# Patient Record
Sex: FEMALE
[De-identification: names, ages, dates, MRNs, and addresses within clinical notes are randomized per-mention and may not be internally consistent; named-entity substitution may affect disease eponyms.]

---

## 2018-06-07 ENCOUNTER — HOSPITAL ENCOUNTER (EMERGENCY)
Dept: HOSPITAL 14 - H.ER | Age: 33
Discharge: HOME | End: 2018-06-07
Payer: COMMERCIAL

## 2018-06-07 VITALS
DIASTOLIC BLOOD PRESSURE: 72 MMHG | RESPIRATION RATE: 16 BRPM | HEART RATE: 84 BPM | OXYGEN SATURATION: 98 % | SYSTOLIC BLOOD PRESSURE: 117 MMHG | TEMPERATURE: 98.3 F

## 2018-06-07 DIAGNOSIS — Z88.0: ICD-10-CM

## 2018-06-07 DIAGNOSIS — M79.604: Primary | ICD-10-CM

## 2018-06-07 NOTE — ED PDOC
Lower Extremity Pain/Injury


Time Seen by Provider: 06/07/18 20:22


Chief Complaint (Nursing): Lower Extremity Problem/Injury


Chief Complaint (Provider): Right calf pain


History Per: Patient


History/Exam Limitations: no limitations


Onset/Duration Of Symptoms: Days (x1 week)


Current Symptoms Are (Timing): Still Present


Additional Complaint(s): 





Citlalli Christian is a 33 year old female, with no significant past medical 

history, who presents to the emergency department complaining of a dull right 

calf pain onset for x1 week. Patient reports she recently travelled to [place]. 

She denies being on birth control. She denies any recent injuries, chest pain, 

shortness of breath, fever, or chills. No further medical complaints.





PMD: None provided





Past Medical History


Reviewed: Historical Data, Nursing Documentation, Vital Signs


Vital Signs: 





 Last Vital Signs











Temp  98.3 F   06/07/18 20:12


 


Pulse  84   06/07/18 20:12


 


Resp  16   06/07/18 20:12


 


BP  117/72   06/07/18 20:12


 


Pulse Ox  98   06/07/18 20:12














- Medical History


PMH: No Chronic Diseases





- Surgical History


Surgical History: No Surg Hx





- Family History


Family History: States: No Known Family Hx





- Allergies


Allergies/Adverse Reactions: 


 Allergies











Allergy/AdvReac Type Severity Reaction Status Date / Time


 


Penicillins Allergy  RASH Verified 06/07/18 20:16














Review of Systems


ROS Statement: Except As Marked, All Systems Reviewed And Found Negative


Constitutional: Negative for: Fever, Chills


Cardiovascular: Negative for: Chest Pain


Respiratory: Negative for: Shortness of Breath


Musculoskeletal: Positive for: Leg Pain (right calf)





Physical Exam





- Reviewed


Nursing Documentation Reviewed: Yes


Vital Signs Reviewed: Yes





- Physical Exam


Appears: Positive for: Non-toxic


Head Exam: Positive for: ATRAUMATIC, NORMOCEPHALIC


Skin: Positive for: Normal Color, Warm, Dry


Eye Exam: Positive for: Normal appearance


Neck: Positive for: Painless ROM


Respiratory: Negative for: Respiratory Distress


Extremity: Positive for: Normal ROM (upper and lower extremities), Other (

stretching sensation with dorsiflexion).  Negative for: Tenderness, Calf 

Tenderness, Deformity, Swelling


Neurologic/Psych: Positive for: Alert, Oriented





- ECG


O2 Sat by Pulse Oximetry: 98 (RA)


Pulse Ox Interpretation: Normal





Medical Decision Making


Medical Decision Making: 





Time: 20:22


Initial Impression: 





Initial Plan:





--Duplex Lower Extrm Vein Right [US]





--------------------------------------------------------------------------------

-----------------


Scribe Attestation:


Documented by Noe Nieto, acting as a scribe for Michelle Amaya PA-C





Provider Scribe Attestation:


All medical record entries made by the Scribe were at my direction and 

personally dictated by me. I have reviewed the chart and agree that the record 

accurately reflects my personal performance of the history, physical exam, 

medical decision making, and the department course for this patient. I have 

also personally directed, reviewed, and agree with the discharge instructions 

and disposition.





Disposition





- Clinical Impression


Clinical Impression: 


 Calf pain








- Patient ED Disposition


Is Patient to be Admitted: No


Counseled Patient/Family Regarding: Diagnosis, Need For Followup





- Disposition


Disposition: Routine/Home


Disposition Time: 21:20


Condition: GOOD


Instructions:  Muscle Strain


Forms:  [employer] (English)

## 2018-06-07 NOTE — US
EXAM:

  US Duplex Right Lower Extremity Veins



CLINICAL HISTORY:

  33 years old, female; Pain; Leg, lower; Right; Additional info: Right calf 

pain



TECHNIQUE:

  Real-time duplex ultrasound scan of the right lower extremity veins 

integrating B-mode two-dimensional vascular structure, Doppler spectral 

analysis, color flow Doppler imaging and compression.



COMPARISON:

  No relevant prior studies available.



FINDINGS:

  Deep veins:  No DVT in the visualized common femoral, femoral, proximal deep 

femoral, popliteal or posterior tibial veins.  The veins demonstrate normal 

color flow, are normally compressible, with normal phasic flow and/or 

augmentation response.



IMPRESSION:     

  No evidence of deep venous thrombosis in the visualized veins of the right 

lower extremity.

## 2020-11-20 ENCOUNTER — RESULT REVIEW (OUTPATIENT)
Age: 35
End: 2020-11-20

## 2021-11-09 ENCOUNTER — APPOINTMENT (OUTPATIENT)
Dept: ANTEPARTUM | Facility: CLINIC | Age: 36
End: 2021-11-09
Payer: COMMERCIAL

## 2021-11-09 ENCOUNTER — ASOB RESULT (OUTPATIENT)
Age: 36
End: 2021-11-09

## 2021-11-09 PROBLEM — Z00.00 ENCOUNTER FOR PREVENTIVE HEALTH EXAMINATION: Status: ACTIVE | Noted: 2021-11-09

## 2021-11-09 PROCEDURE — 76813 OB US NUCHAL MEAS 1 GEST: CPT

## 2021-11-09 PROCEDURE — 76801 OB US < 14 WKS SINGLE FETUS: CPT

## 2021-12-07 ENCOUNTER — APPOINTMENT (OUTPATIENT)
Dept: ANTEPARTUM | Facility: CLINIC | Age: 36
End: 2021-12-07
Payer: COMMERCIAL

## 2021-12-07 ENCOUNTER — TRANSCRIPTION ENCOUNTER (OUTPATIENT)
Age: 36
End: 2021-12-07

## 2021-12-07 ENCOUNTER — ASOB RESULT (OUTPATIENT)
Age: 36
End: 2021-12-07

## 2021-12-07 PROCEDURE — 76811 OB US DETAILED SNGL FETUS: CPT

## 2021-12-07 PROCEDURE — 76817 TRANSVAGINAL US OBSTETRIC: CPT

## 2022-01-11 ENCOUNTER — APPOINTMENT (OUTPATIENT)
Dept: ANTEPARTUM | Facility: CLINIC | Age: 37
End: 2022-01-11
Payer: COMMERCIAL

## 2022-01-11 ENCOUNTER — ASOB RESULT (OUTPATIENT)
Age: 37
End: 2022-01-11

## 2022-01-11 PROCEDURE — 76816 OB US FOLLOW-UP PER FETUS: CPT

## 2022-01-25 ENCOUNTER — ASOB RESULT (OUTPATIENT)
Age: 37
End: 2022-01-25

## 2022-01-25 ENCOUNTER — APPOINTMENT (OUTPATIENT)
Dept: ANTEPARTUM | Facility: CLINIC | Age: 37
End: 2022-01-25
Payer: COMMERCIAL

## 2022-01-25 PROCEDURE — 76819 FETAL BIOPHYS PROFIL W/O NST: CPT

## 2022-03-15 ENCOUNTER — APPOINTMENT (OUTPATIENT)
Dept: ANTEPARTUM | Facility: CLINIC | Age: 37
End: 2022-03-15
Payer: COMMERCIAL

## 2022-03-15 ENCOUNTER — ASOB RESULT (OUTPATIENT)
Age: 37
End: 2022-03-15

## 2022-03-15 PROCEDURE — 76819 FETAL BIOPHYS PROFIL W/O NST: CPT

## 2022-04-05 ENCOUNTER — APPOINTMENT (OUTPATIENT)
Dept: ANTEPARTUM | Facility: CLINIC | Age: 37
End: 2022-04-05
Payer: COMMERCIAL

## 2022-04-05 ENCOUNTER — ASOB RESULT (OUTPATIENT)
Age: 37
End: 2022-04-05

## 2022-04-05 PROCEDURE — 76818 FETAL BIOPHYS PROFILE W/NST: CPT

## 2022-04-19 ENCOUNTER — ASOB RESULT (OUTPATIENT)
Age: 37
End: 2022-04-19

## 2022-04-19 ENCOUNTER — APPOINTMENT (OUTPATIENT)
Dept: ANTEPARTUM | Facility: CLINIC | Age: 37
End: 2022-04-19
Payer: COMMERCIAL

## 2022-04-19 PROCEDURE — 76819 FETAL BIOPHYS PROFIL W/O NST: CPT

## 2022-04-28 ENCOUNTER — ASOB RESULT (OUTPATIENT)
Age: 37
End: 2022-04-28

## 2022-04-28 ENCOUNTER — APPOINTMENT (OUTPATIENT)
Dept: ANTEPARTUM | Facility: CLINIC | Age: 37
End: 2022-04-28
Payer: COMMERCIAL

## 2022-04-28 PROCEDURE — 76816 OB US FOLLOW-UP PER FETUS: CPT

## 2022-04-28 PROCEDURE — 76818 FETAL BIOPHYS PROFILE W/NST: CPT

## 2022-05-05 ENCOUNTER — APPOINTMENT (OUTPATIENT)
Dept: ANTEPARTUM | Facility: CLINIC | Age: 37
End: 2022-05-05
Payer: COMMERCIAL

## 2022-05-05 ENCOUNTER — ASOB RESULT (OUTPATIENT)
Age: 37
End: 2022-05-05

## 2022-05-05 PROCEDURE — 76819 FETAL BIOPHYS PROFIL W/O NST: CPT

## 2022-05-05 PROCEDURE — 76816 OB US FOLLOW-UP PER FETUS: CPT

## 2022-05-12 ENCOUNTER — ASOB RESULT (OUTPATIENT)
Age: 37
End: 2022-05-12

## 2022-05-12 ENCOUNTER — APPOINTMENT (OUTPATIENT)
Dept: ANTEPARTUM | Facility: CLINIC | Age: 37
End: 2022-05-12
Payer: COMMERCIAL

## 2022-05-12 PROCEDURE — 76816 OB US FOLLOW-UP PER FETUS: CPT

## 2022-05-12 PROCEDURE — 76818 FETAL BIOPHYS PROFILE W/NST: CPT

## 2022-05-19 ENCOUNTER — APPOINTMENT (OUTPATIENT)
Dept: ANTEPARTUM | Facility: CLINIC | Age: 37
End: 2022-05-19
Payer: COMMERCIAL

## 2022-05-19 ENCOUNTER — ASOB RESULT (OUTPATIENT)
Age: 37
End: 2022-05-19

## 2022-05-19 PROCEDURE — 76816 OB US FOLLOW-UP PER FETUS: CPT

## 2022-05-19 PROCEDURE — 76819 FETAL BIOPHYS PROFIL W/O NST: CPT

## 2022-05-21 ENCOUNTER — TRANSCRIPTION ENCOUNTER (OUTPATIENT)
Age: 37
End: 2022-05-21

## 2022-05-22 ENCOUNTER — RESULT REVIEW (OUTPATIENT)
Age: 37
End: 2022-05-22

## 2022-05-22 ENCOUNTER — INPATIENT (INPATIENT)
Facility: HOSPITAL | Age: 37
LOS: 2 days | Discharge: HOME | End: 2022-05-25
Attending: OBSTETRICS & GYNECOLOGY | Admitting: OBSTETRICS & GYNECOLOGY
Payer: COMMERCIAL

## 2022-05-22 VITALS — WEIGHT: 169.98 LBS | HEIGHT: 62 IN

## 2022-05-22 DIAGNOSIS — Z34.83 ENCOUNTER FOR SUPERVISION OF OTHER NORMAL PREGNANCY, THIRD TRIMESTER: ICD-10-CM

## 2022-05-22 DIAGNOSIS — Z86.19 PERSONAL HISTORY OF OTHER INFECTIOUS AND PARASITIC DISEASES: ICD-10-CM

## 2022-05-22 DIAGNOSIS — O48.0 POST-TERM PREGNANCY: ICD-10-CM

## 2022-05-22 DIAGNOSIS — O32.4XX0 MATERNAL CARE FOR HIGH HEAD AT TERM, NOT APPLICABLE OR UNSPECIFIED: ICD-10-CM

## 2022-05-22 DIAGNOSIS — Z3A.40 40 WEEKS GESTATION OF PREGNANCY: ICD-10-CM

## 2022-05-22 DIAGNOSIS — Z88.0 ALLERGY STATUS TO PENICILLIN: ICD-10-CM

## 2022-05-22 LAB
BASOPHILS # BLD AUTO: 0.01 K/UL — SIGNIFICANT CHANGE UP (ref 0–0.2)
BASOPHILS NFR BLD AUTO: 0.1 % — SIGNIFICANT CHANGE UP (ref 0–2)
BLD GP AB SCN SERPL QL: NEGATIVE — SIGNIFICANT CHANGE UP
COVID-19 SPIKE DOMAIN AB INTERP: POSITIVE
COVID-19 SPIKE DOMAIN ANTIBODY RESULT: >250 U/ML — HIGH
EOSINOPHIL # BLD AUTO: 0.06 K/UL — SIGNIFICANT CHANGE UP (ref 0–0.5)
EOSINOPHIL NFR BLD AUTO: 0.7 % — SIGNIFICANT CHANGE UP (ref 0–6)
GLUCOSE BLDC GLUCOMTR-MCNC: 87 MG/DL — SIGNIFICANT CHANGE UP (ref 70–99)
GLUCOSE BLDC GLUCOMTR-MCNC: 90 MG/DL — SIGNIFICANT CHANGE UP (ref 70–99)
GLUCOSE BLDC GLUCOMTR-MCNC: 90 MG/DL — SIGNIFICANT CHANGE UP (ref 70–99)
HCT VFR BLD CALC: 34 % — LOW (ref 34.5–45)
HGB BLD-MCNC: 11.2 G/DL — LOW (ref 11.5–15.5)
IMM GRANULOCYTES NFR BLD AUTO: 0.6 % — SIGNIFICANT CHANGE UP (ref 0–1.5)
LYMPHOCYTES # BLD AUTO: 1.14 K/UL — SIGNIFICANT CHANGE UP (ref 1–3.3)
LYMPHOCYTES # BLD AUTO: 13.2 % — SIGNIFICANT CHANGE UP (ref 13–44)
MCHC RBC-ENTMCNC: 29.6 PG — SIGNIFICANT CHANGE UP (ref 27–34)
MCHC RBC-ENTMCNC: 32.9 GM/DL — SIGNIFICANT CHANGE UP (ref 32–36)
MCV RBC AUTO: 89.9 FL — SIGNIFICANT CHANGE UP (ref 80–100)
MONOCYTES # BLD AUTO: 0.62 K/UL — SIGNIFICANT CHANGE UP (ref 0–0.9)
MONOCYTES NFR BLD AUTO: 7.2 % — SIGNIFICANT CHANGE UP (ref 2–14)
NEUTROPHILS # BLD AUTO: 6.75 K/UL — SIGNIFICANT CHANGE UP (ref 1.8–7.4)
NEUTROPHILS NFR BLD AUTO: 78.2 % — HIGH (ref 43–77)
NRBC # BLD: 0 /100 WBCS — SIGNIFICANT CHANGE UP (ref 0–0)
PLATELET # BLD AUTO: 151 K/UL — SIGNIFICANT CHANGE UP (ref 150–400)
RBC # BLD: 3.78 M/UL — LOW (ref 3.8–5.2)
RBC # FLD: 16.1 % — HIGH (ref 10.3–14.5)
RH IG SCN BLD-IMP: POSITIVE — SIGNIFICANT CHANGE UP
RH IG SCN BLD-IMP: POSITIVE — SIGNIFICANT CHANGE UP
SARS-COV-2 IGG+IGM SERPL QL IA: >250 U/ML — HIGH
SARS-COV-2 IGG+IGM SERPL QL IA: POSITIVE
SARS-COV-2 RNA SPEC QL NAA+PROBE: SIGNIFICANT CHANGE UP
WBC # BLD: 8.63 K/UL — SIGNIFICANT CHANGE UP (ref 3.8–10.5)
WBC # FLD AUTO: 8.63 K/UL — SIGNIFICANT CHANGE UP (ref 3.8–10.5)

## 2022-05-22 PROCEDURE — 88307 TISSUE EXAM BY PATHOLOGIST: CPT | Mod: 26

## 2022-05-22 DEVICE — SEPRAFILM 5 X 6": Type: IMPLANTABLE DEVICE | Status: FUNCTIONAL

## 2022-05-22 DEVICE — ARISTA 3GR: Type: IMPLANTABLE DEVICE | Status: FUNCTIONAL

## 2022-05-22 RX ORDER — KETOROLAC TROMETHAMINE 30 MG/ML
30 SYRINGE (ML) INJECTION EVERY 6 HOURS
Refills: 0 | Status: DISCONTINUED | OUTPATIENT
Start: 2022-05-22 | End: 2022-05-23

## 2022-05-22 RX ORDER — CITRIC ACID/SODIUM CITRATE 300-500 MG
15 SOLUTION, ORAL ORAL EVERY 6 HOURS
Refills: 0 | Status: DISCONTINUED | OUTPATIENT
Start: 2022-05-22 | End: 2022-05-22

## 2022-05-22 RX ORDER — OXYTOCIN 10 UNIT/ML
333.33 VIAL (ML) INJECTION
Qty: 20 | Refills: 0 | Status: DISCONTINUED | OUTPATIENT
Start: 2022-05-22 | End: 2022-05-22

## 2022-05-22 RX ORDER — OXYTOCIN 10 UNIT/ML
333.33 VIAL (ML) INJECTION
Qty: 20 | Refills: 0 | Status: DISCONTINUED | OUTPATIENT
Start: 2022-05-22 | End: 2022-05-25

## 2022-05-22 RX ORDER — OXYTOCIN 10 UNIT/ML
VIAL (ML) INJECTION
Qty: 30 | Refills: 0 | Status: DISCONTINUED | OUTPATIENT
Start: 2022-05-22 | End: 2022-05-22

## 2022-05-22 RX ORDER — OXYCODONE HYDROCHLORIDE 5 MG/1
5 TABLET ORAL
Refills: 0 | Status: DISCONTINUED | OUTPATIENT
Start: 2022-05-22 | End: 2022-05-25

## 2022-05-22 RX ORDER — OXYCODONE HYDROCHLORIDE 5 MG/1
5 TABLET ORAL ONCE
Refills: 0 | Status: DISCONTINUED | OUTPATIENT
Start: 2022-05-22 | End: 2022-05-25

## 2022-05-22 RX ORDER — FENTANYL/BUPIVACAINE/NS/PF 2MCG/ML-.1
250 PLASTIC BAG, INJECTION (ML) INJECTION
Refills: 0 | Status: DISCONTINUED | OUTPATIENT
Start: 2022-05-22 | End: 2022-05-22

## 2022-05-22 RX ORDER — IBUPROFEN 200 MG
600 TABLET ORAL EVERY 6 HOURS
Refills: 0 | Status: COMPLETED | OUTPATIENT
Start: 2022-05-22 | End: 2023-04-20

## 2022-05-22 RX ORDER — SODIUM CHLORIDE 9 MG/ML
1000 INJECTION, SOLUTION INTRAVENOUS
Refills: 0 | Status: DISCONTINUED | OUTPATIENT
Start: 2022-05-22 | End: 2022-05-25

## 2022-05-22 RX ORDER — AZITHROMYCIN 500 MG/1
500 TABLET, FILM COATED ORAL ONCE
Refills: 0 | Status: COMPLETED | OUTPATIENT
Start: 2022-05-22 | End: 2022-05-22

## 2022-05-22 RX ORDER — SIMETHICONE 80 MG/1
80 TABLET, CHEWABLE ORAL EVERY 4 HOURS
Refills: 0 | Status: DISCONTINUED | OUTPATIENT
Start: 2022-05-22 | End: 2022-05-25

## 2022-05-22 RX ORDER — CEFAZOLIN SODIUM 1 G
2000 VIAL (EA) INJECTION ONCE
Refills: 0 | Status: COMPLETED | OUTPATIENT
Start: 2022-05-22 | End: 2022-05-22

## 2022-05-22 RX ORDER — CHLORHEXIDINE GLUCONATE 213 G/1000ML
1 SOLUTION TOPICAL DAILY
Refills: 0 | Status: DISCONTINUED | OUTPATIENT
Start: 2022-05-22 | End: 2022-05-22

## 2022-05-22 RX ORDER — LANOLIN
1 OINTMENT (GRAM) TOPICAL EVERY 6 HOURS
Refills: 0 | Status: DISCONTINUED | OUTPATIENT
Start: 2022-05-22 | End: 2022-05-25

## 2022-05-22 RX ORDER — SODIUM CHLORIDE 9 MG/ML
1000 INJECTION, SOLUTION INTRAVENOUS
Refills: 0 | Status: DISCONTINUED | OUTPATIENT
Start: 2022-05-22 | End: 2022-05-22

## 2022-05-22 RX ORDER — ACETAMINOPHEN 500 MG
975 TABLET ORAL
Refills: 0 | Status: DISCONTINUED | OUTPATIENT
Start: 2022-05-22 | End: 2022-05-25

## 2022-05-22 RX ORDER — TETANUS TOXOID, REDUCED DIPHTHERIA TOXOID AND ACELLULAR PERTUSSIS VACCINE, ADSORBED 5; 2.5; 8; 8; 2.5 [IU]/.5ML; [IU]/.5ML; UG/.5ML; UG/.5ML; UG/.5ML
0.5 SUSPENSION INTRAMUSCULAR ONCE
Refills: 0 | Status: DISCONTINUED | OUTPATIENT
Start: 2022-05-22 | End: 2022-05-25

## 2022-05-22 RX ORDER — DIPHENHYDRAMINE HCL 50 MG
25 CAPSULE ORAL EVERY 6 HOURS
Refills: 0 | Status: DISCONTINUED | OUTPATIENT
Start: 2022-05-22 | End: 2022-05-25

## 2022-05-22 RX ORDER — MAGNESIUM HYDROXIDE 400 MG/1
30 TABLET, CHEWABLE ORAL
Refills: 0 | Status: DISCONTINUED | OUTPATIENT
Start: 2022-05-22 | End: 2022-05-25

## 2022-05-22 RX ADMIN — AZITHROMYCIN 255 MILLIGRAM(S): 500 TABLET, FILM COATED ORAL at 20:30

## 2022-05-22 RX ADMIN — Medication 2 MILLIUNIT(S)/MIN: at 08:58

## 2022-05-22 RX ADMIN — Medication 30 MILLIGRAM(S): at 22:17

## 2022-05-22 RX ADMIN — Medication 30 MILLIGRAM(S): at 23:00

## 2022-05-22 RX ADMIN — Medication 250 MILLILITER(S): at 12:34

## 2022-05-22 RX ADMIN — Medication 15 MILLILITER(S): at 20:13

## 2022-05-22 RX ADMIN — Medication 100 MILLIGRAM(S): at 19:55

## 2022-05-22 RX ADMIN — Medication 250 MILLILITER(S): at 12:33

## 2022-05-22 NOTE — PATIENT PROFILE OB - FALL HARM RISK - UNIVERSAL INTERVENTIONS
Bed in lowest position, wheels locked, appropriate side rails in place/Call bell, personal items and telephone in reach/Instruct patient to call for assistance before getting out of bed or chair/Non-slip footwear when patient is out of bed/[place] to call system/Physically safe environment - no spills, clutter or unnecessary equipment/Purposeful Proactive Rounding/Room/bathroom lighting operational, light cord in reach

## 2022-05-23 LAB
BASOPHILS # BLD AUTO: 0.01 K/UL — SIGNIFICANT CHANGE UP (ref 0–0.2)
BASOPHILS NFR BLD AUTO: 0.1 % — SIGNIFICANT CHANGE UP (ref 0–2)
EOSINOPHIL # BLD AUTO: 0.01 K/UL — SIGNIFICANT CHANGE UP (ref 0–0.5)
EOSINOPHIL NFR BLD AUTO: 0.1 % — SIGNIFICANT CHANGE UP (ref 0–6)
HCT VFR BLD CALC: 30.3 % — LOW (ref 34.5–45)
HGB BLD-MCNC: 9.7 G/DL — LOW (ref 11.5–15.5)
IMM GRANULOCYTES NFR BLD AUTO: 0.4 % — SIGNIFICANT CHANGE UP (ref 0–1.5)
LYMPHOCYTES # BLD AUTO: 0.66 K/UL — LOW (ref 1–3.3)
LYMPHOCYTES # BLD AUTO: 6.4 % — LOW (ref 13–44)
MCHC RBC-ENTMCNC: 29.5 PG — SIGNIFICANT CHANGE UP (ref 27–34)
MCHC RBC-ENTMCNC: 32 GM/DL — SIGNIFICANT CHANGE UP (ref 32–36)
MCV RBC AUTO: 92.1 FL — SIGNIFICANT CHANGE UP (ref 80–100)
MONOCYTES # BLD AUTO: 0.3 K/UL — SIGNIFICANT CHANGE UP (ref 0–0.9)
MONOCYTES NFR BLD AUTO: 2.9 % — SIGNIFICANT CHANGE UP (ref 2–14)
NEUTROPHILS # BLD AUTO: 9.29 K/UL — HIGH (ref 1.8–7.4)
NEUTROPHILS NFR BLD AUTO: 90.1 % — HIGH (ref 43–77)
NRBC # BLD: 0 /100 WBCS — SIGNIFICANT CHANGE UP (ref 0–0)
PLATELET # BLD AUTO: 113 K/UL — LOW (ref 150–400)
RBC # BLD: 3.29 M/UL — LOW (ref 3.8–5.2)
RBC # FLD: 16.6 % — HIGH (ref 10.3–14.5)
T PALLIDUM AB TITR SER: NEGATIVE — SIGNIFICANT CHANGE UP
WBC # BLD: 10.31 K/UL — SIGNIFICANT CHANGE UP (ref 3.8–10.5)
WBC # FLD AUTO: 10.31 K/UL — SIGNIFICANT CHANGE UP (ref 3.8–10.5)

## 2022-05-23 RX ORDER — IBUPROFEN 200 MG
600 TABLET ORAL EVERY 6 HOURS
Refills: 0 | Status: DISCONTINUED | OUTPATIENT
Start: 2022-05-23 | End: 2022-05-25

## 2022-05-23 RX ADMIN — Medication 30 MILLIGRAM(S): at 08:30

## 2022-05-23 RX ADMIN — Medication 975 MILLIGRAM(S): at 06:13

## 2022-05-23 RX ADMIN — Medication 975 MILLIGRAM(S): at 17:45

## 2022-05-23 RX ADMIN — Medication 30 MILLIGRAM(S): at 03:58

## 2022-05-23 RX ADMIN — Medication 600 MILLIGRAM(S): at 14:50

## 2022-05-23 RX ADMIN — Medication 600 MILLIGRAM(S): at 22:28

## 2022-05-23 RX ADMIN — Medication 975 MILLIGRAM(S): at 06:35

## 2022-05-23 RX ADMIN — Medication 975 MILLIGRAM(S): at 01:35

## 2022-05-23 RX ADMIN — Medication 600 MILLIGRAM(S): at 14:18

## 2022-05-23 RX ADMIN — SIMETHICONE 80 MILLIGRAM(S): 80 TABLET, CHEWABLE ORAL at 14:20

## 2022-05-23 RX ADMIN — Medication 30 MILLIGRAM(S): at 04:30

## 2022-05-23 RX ADMIN — SIMETHICONE 80 MILLIGRAM(S): 80 TABLET, CHEWABLE ORAL at 07:50

## 2022-05-23 RX ADMIN — Medication 30 MILLIGRAM(S): at 07:51

## 2022-05-23 RX ADMIN — Medication 975 MILLIGRAM(S): at 11:31

## 2022-05-23 RX ADMIN — Medication 975 MILLIGRAM(S): at 17:14

## 2022-05-23 RX ADMIN — Medication 975 MILLIGRAM(S): at 12:00

## 2022-05-23 RX ADMIN — Medication 975 MILLIGRAM(S): at 01:05

## 2022-05-23 RX ADMIN — Medication 600 MILLIGRAM(S): at 21:28

## 2022-05-23 NOTE — LACTATION INITIAL EVALUATION - NS LACT CON REASON FOR REQ
40 wk gestation baby boy seen around 14 hrs of life, voiding and stooling, has been mainly bottle fed since birth. Mother wishes to both breast and bottle feed, and implications of early intro to artificial nipples and mixed feeding was discussed, as well as the supply and demand feedback system for milk production. Intake guidelines and size of  stomach also discussed. Complete breastfeeding education provided to primip mom. Bedside RN had assisted mom into a football hold. Proper positioning and latch strategies for this hold were taught, and baby deeply attached to the right breast with sustained suction, sucking with gentle stim between rest pauses. Baby fed for 10 minutes and mother confirmed a strong pull of the nipple, denied discomfort. Baby placed to left breast where he again deeply attached and continues to feed at this time. Manual expression technique was also taught to the mother, colostrum easily expressible bilaterally. Increased SSC and rooming-in encouraged. All questions were answered, mother verbalized understanding of teaching and info given. Referral for lactation telehealth was placed for further support s/p d/c./primaparous mom

## 2022-05-24 ENCOUNTER — TRANSCRIPTION ENCOUNTER (OUTPATIENT)
Age: 37
End: 2022-05-24

## 2022-05-24 RX ORDER — ENOXAPARIN SODIUM 100 MG/ML
40 INJECTION SUBCUTANEOUS EVERY 24 HOURS
Refills: 0 | Status: DISCONTINUED | OUTPATIENT
Start: 2022-05-24 | End: 2022-05-25

## 2022-05-24 RX ADMIN — Medication 600 MILLIGRAM(S): at 04:20

## 2022-05-24 RX ADMIN — Medication 975 MILLIGRAM(S): at 00:24

## 2022-05-24 RX ADMIN — Medication 975 MILLIGRAM(S): at 12:36

## 2022-05-24 RX ADMIN — Medication 600 MILLIGRAM(S): at 15:14

## 2022-05-24 RX ADMIN — ENOXAPARIN SODIUM 40 MILLIGRAM(S): 100 INJECTION SUBCUTANEOUS at 12:45

## 2022-05-24 RX ADMIN — Medication 600 MILLIGRAM(S): at 03:20

## 2022-05-24 RX ADMIN — Medication 975 MILLIGRAM(S): at 08:00

## 2022-05-24 RX ADMIN — Medication 975 MILLIGRAM(S): at 13:31

## 2022-05-24 RX ADMIN — Medication 975 MILLIGRAM(S): at 18:31

## 2022-05-24 RX ADMIN — Medication 975 MILLIGRAM(S): at 01:24

## 2022-05-24 RX ADMIN — Medication 600 MILLIGRAM(S): at 09:25

## 2022-05-24 RX ADMIN — Medication 975 MILLIGRAM(S): at 17:47

## 2022-05-24 RX ADMIN — Medication 600 MILLIGRAM(S): at 21:30

## 2022-05-24 RX ADMIN — Medication 600 MILLIGRAM(S): at 10:00

## 2022-05-24 RX ADMIN — Medication 600 MILLIGRAM(S): at 21:00

## 2022-05-24 RX ADMIN — SIMETHICONE 80 MILLIGRAM(S): 80 TABLET, CHEWABLE ORAL at 17:46

## 2022-05-24 RX ADMIN — SIMETHICONE 80 MILLIGRAM(S): 80 TABLET, CHEWABLE ORAL at 07:02

## 2022-05-24 RX ADMIN — Medication 975 MILLIGRAM(S): at 07:00

## 2022-05-24 RX ADMIN — SIMETHICONE 80 MILLIGRAM(S): 80 TABLET, CHEWABLE ORAL at 12:36

## 2022-05-24 RX ADMIN — Medication 600 MILLIGRAM(S): at 15:56

## 2022-05-24 NOTE — DISCHARGE NOTE OB - NS MD DC FALL RISK RISK
For information on Fall & Injury Prevention, visit: [email or web address] OR  [email or web address] OR  https://www.cdc.gov/steadi/patient.html

## 2022-05-24 NOTE — DISCHARGE NOTE OB - CARE PROVIDER_API CALL
Alize Spears)  Obstetrics and Gynecology  [address] [place] [address]  Phone: [phone]  Fax: [phone]  Follow Up Time:

## 2022-05-24 NOTE — DISCHARGE NOTE OB - PATIENT PORTAL LINK FT
You can access the FollowMyHealth Patient Portal offered by [facility] by registering at the following website: [email or web address]. By joining [employer]’s FollowMyHealth portal, you will also be able to view your health information using other applications (apps) compatible with our system.

## 2022-05-24 NOTE — DISCHARGE NOTE OB - CARE PLAN
Principal Discharge DX:	Postpartum state  Assessment and plan of treatment:	 delivery, meeting all postoperative milestones.  Follow up in 1-2 weeks.  Secondary Diagnosis:	Acute blood loss anemia   1

## 2022-05-25 VITALS
DIASTOLIC BLOOD PRESSURE: 78 MMHG | RESPIRATION RATE: 17 BRPM | HEART RATE: 89 BPM | TEMPERATURE: 98 F | SYSTOLIC BLOOD PRESSURE: 120 MMHG | OXYGEN SATURATION: 97 %

## 2022-05-25 PROCEDURE — 88307 TISSUE EXAM BY PATHOLOGIST: CPT

## 2022-05-25 PROCEDURE — 82962 GLUCOSE BLOOD TEST: CPT

## 2022-05-25 PROCEDURE — C1765: CPT

## 2022-05-25 PROCEDURE — 86850 RBC ANTIBODY SCREEN: CPT

## 2022-05-25 PROCEDURE — 87635 SARS-COV-2 COVID-19 AMP PRB: CPT

## 2022-05-25 PROCEDURE — 86780 TREPONEMA PALLIDUM: CPT

## 2022-05-25 PROCEDURE — 86901 BLOOD TYPING SEROLOGIC RH(D): CPT

## 2022-05-25 PROCEDURE — 86900 BLOOD TYPING SEROLOGIC ABO: CPT

## 2022-05-25 PROCEDURE — 85025 COMPLETE CBC W/AUTO DIFF WBC: CPT

## 2022-05-25 PROCEDURE — 36415 COLL VENOUS BLD VENIPUNCTURE: CPT

## 2022-05-25 PROCEDURE — C1889: CPT

## 2022-05-25 PROCEDURE — 86769 SARS-COV-2 COVID-19 ANTIBODY: CPT

## 2022-05-25 PROCEDURE — 59050 FETAL MONITOR W/REPORT: CPT

## 2022-05-25 RX ADMIN — SIMETHICONE 80 MILLIGRAM(S): 80 TABLET, CHEWABLE ORAL at 05:49

## 2022-05-25 RX ADMIN — Medication 975 MILLIGRAM(S): at 05:50

## 2022-05-25 RX ADMIN — Medication 975 MILLIGRAM(S): at 12:15

## 2022-05-25 RX ADMIN — ENOXAPARIN SODIUM 40 MILLIGRAM(S): 100 INJECTION SUBCUTANEOUS at 12:14

## 2022-05-25 RX ADMIN — Medication 975 MILLIGRAM(S): at 06:20

## 2022-05-25 RX ADMIN — Medication 600 MILLIGRAM(S): at 08:59

## 2022-05-25 RX ADMIN — SIMETHICONE 80 MILLIGRAM(S): 80 TABLET, CHEWABLE ORAL at 12:14

## 2022-05-25 RX ADMIN — Medication 975 MILLIGRAM(S): at 00:30

## 2022-05-25 RX ADMIN — Medication 600 MILLIGRAM(S): at 09:59

## 2022-05-25 RX ADMIN — Medication 600 MILLIGRAM(S): at 02:50

## 2022-05-25 RX ADMIN — Medication 975 MILLIGRAM(S): at 00:02

## 2022-05-25 RX ADMIN — Medication 600 MILLIGRAM(S): at 03:20

## 2022-05-25 RX ADMIN — SIMETHICONE 80 MILLIGRAM(S): 80 TABLET, CHEWABLE ORAL at 00:05

## 2022-05-25 RX ADMIN — Medication 975 MILLIGRAM(S): at 13:15

## 2022-05-25 NOTE — PROGRESS NOTE ADULT - ASSESSMENT
37y Female POD#2    s/p C/S, Uncomplicated                                       - Neuro/Pain:  toradol atc, tylenol atc, oxy prn  - CV:  VS per routine  - Pulm: Encourage ISS & Ambulation  - GI: Advance as tolerated  - : Voiding spontaneously  - DVT ppx: SCDs, Lovenox 40mg QD  - Dispo: POD #2/3
37y Female POD#3    s/p C/S, Uncomplicated                                       - Neuro/Pain:  toradol atc, tylenol atc, oxy prn  - CV:  VS per routine  - Pulm: Encourage ISS & Ambulation  - GI: Advance as tolerated  - : Voiding spontaneously  - DVT ppx: SCDs, Lovenox 40mg QD  - Dispo: POD #3
A/P  37y s/p c/s, POD #1  ,stable    Diet: clears until flatus  Pain: OPM  IV fluid: LR @ 125cc/hr  remove meek cath, TOV  OOB/SCDs/IS  Continue to monitor  Anticipate discharge POD #3 or #4
37y Female POD#1    s/p C/S, Uncomplicated                                       - Neuro/Pain:  toradol atc, tylenol atc, oxy prn  - CV:  VS per routine, AM CBC pending  - Pulm: Encourage ISS & Ambulation  - GI: Advance as tolerated  - : Conde in place, to be removed this morning, TOV this afternoon  - DVT ppx: SCDs, Lovenox 40mg QD  - Dispo: POD #2/3

## 2022-05-25 NOTE — PROGRESS NOTE ADULT - SUBJECTIVE AND OBJECTIVE BOX
Delayed entry due to pt care, pt seen at 10a. Doing well. Denies n/v/f/c, cp, or dyspnea. Pt tolerated breakfast this morning, has started passing flatus. Meek still in place      Physical Exam:  Vital Signs Last 24 Hrs  T(C): 36.9 (23 May 2022 14:00), Max: 37.8 (22 May 2022 23:25)  T(F): 98.4 (23 May 2022 14:00), Max: 100 (22 May 2022 23:25)  HR: 108 (23 May 2022 14:00) (81 - 113)  BP: 110/73 (23 May 2022 14:00) (110/73 - 139/84)  BP(mean): 86 (23 May 2022 14:00) (86 - 86)  RR: 18 (23 May 2022 14:00) (15 - 19)  SpO2: 98% (23 May 2022 14:00) (95% - 98%)    GA: NAD, A+0 x 3  Abd: + BS, soft, nontender, nondistended, no rebound or guarding, uterus firm at midline, 2 fb below umbilicus  Incision clean, dry and intact  : lochia WNL, meek cath in place  Extremities: no swelling or calf tenderness                            9.7    10.31 )-----------( 113      ( 23 May 2022 09:21 )             30.3               
Patient evaluated at bedside.   She reports pain is well controlled with OPM.  She has been ambulating without assistance, voiding spontaneously, passing gas, tolerating regular diet and is breastfeeding.    She denies HA, dizziness, CP, palpitations, SOB, n/v, or heavy vaginal bleeding.    Physical Exam:  vss  Gen: NAD  Abd: + BS, soft, nontender, nondistended, no rebound or guarding  Incision clean, dry and intact  uterus firm at midline,   fb below umbilicus  : lochia WNL  Extremities: no swelling or calf tenderness    pod3 stable
Patient evaluated at bedside.   She reports pain is well controlled.  Conde in place  No Flatus  Not OOB yet.    She denies HA, dizziness, CP, palpitations, SOB, n/v, or heavy vaginal bleeding.    Physical Exam:  Vital Signs Last 24 Hrs  T(C): 36.9 (23 May 2022 06:57), Max: 37.8 (22 May 2022 23:25)  T(F): 98.4 (23 May 2022 06:57), Max: 100 (22 May 2022 23:25)  HR: 100 (23 May 2022 06:57) (81 - 100)  BP: 120/80 (23 May 2022 06:57) (120/69 - 139/84)  BP(mean): --  RR: 18 (23 May 2022 06:57) (15 - 19)  SpO2: 95% (23 May 2022 06:57) (95% - 98%)    Gen: NAD  Abd: + BS, soft, nontender, nondistended, no rebound or guarding  Incision clean, dry and intact  uterus firm at midline  : lochia WNL  Extremities: no swelling or calf tenderness                          11.2   8.63  )-----------( 151      ( 22 May 2022 08:08 )             34.0               
Patient evaluated at bedside. No acute events overnight. She reports pain is well controlled with OPM. She denies headache, dizziness, chest pain, palpitations, shortness of breath, nausea, vomiting or heavy vaginal bleeding.      Physical Exam:  Vital Signs Last 24 Hrs  T(C): 36.3 (24 May 2022 05:28), Max: 36.9 (23 May 2022 14:00)  T(F): 97.3 (24 May 2022 05:28), Max: 98.4 (23 May 2022 14:00)  HR: 89 (24 May 2022 05:28) (80 - 113)  BP: 111/63 (24 May 2022 05:28) (106/68 - 122/77)  BP(mean): 86 (23 May 2022 14:00) (86 - 86)  RR: 18 (24 May 2022 05:28) (18 - 18)  SpO2: 97% (24 May 2022 05:28) (97% - 98%)    GA: NAD, A+0 x 3  Abd: + BS, soft, nontender, mildly distended, no rebound or guarding, uterus firm at midline, 2 fb below umbilicus  Incision clean, dry and intact  : lochia WNL  Extremities: no swelling or calf tenderness                            9.7    10.31 )-----------( 113      ( 23 May 2022 09:21 )             30.3     A/P  yo 37y s/p c/s, POD # 2 ,stable    Diet: regular  Pain: OPM  OOB/SCDs/IS  Continue to monitor  Anticipate discharge POD #3 or #4          
Patient evaluated at bedside.   She reports pain is well controlled with OPM.  She has been ambulating without assistance, voiding spontaneously, passing gas, tolerating regular diet and is breastfeeding.    She denies HA, dizziness, CP, palpitations, SOB, n/v, or heavy vaginal bleeding.    Physical Exam:  Vital Signs Last 24 Hrs  T(C): 36.9 (25 May 2022 06:44), Max: 36.9 (24 May 2022 10:00)  T(F): 98.4 (25 May 2022 06:44), Max: 98.4 (24 May 2022 10:00)  HR: 84 (25 May 2022 06:44) (80 - 93)  BP: 113/75 (25 May 2022 06:44) (113/75 - 119/81)  BP(mean): 88 (25 May 2022 06:44) (88 - 88)  RR: 18 (25 May 2022 06:44) (18 - 18)  SpO2: 95% (25 May 2022 06:44) (95% - 98%)    Gen: NAD  Abd: + BS, soft, nontender, nondistended, no rebound or guarding  Incision clean, dry and intact  uterus firm at midline  : lochia WNL  Extremities: no swelling or calf tenderness                          9.7    10.31 )-----------( 113      ( 23 May 2022 09:21 )             30.3               
Patient evaluated at bedside.   She reports pain is well controlled with OPM.  She has been ambulating without assistance, voiding spontaneously, tolerating regular diet and is breastfeeding. Has passed flatus but nothing regularly    She denies HA, dizziness, CP, palpitations, SOB, n/v, or heavy vaginal bleeding.    Physical Exam:  Vital Signs Last 24 Hrs  T(C): 36.3 (24 May 2022 05:28), Max: 36.9 (23 May 2022 14:00)  T(F): 97.3 (24 May 2022 05:28), Max: 98.4 (23 May 2022 14:00)  HR: 89 (24 May 2022 05:28) (80 - 113)  BP: 111/63 (24 May 2022 05:28) (106/68 - 122/77)  BP(mean): 86 (23 May 2022 14:00) (86 - 86)  RR: 18 (24 May 2022 05:28) (18 - 18)  SpO2: 97% (24 May 2022 05:28) (97% - 98%)    Gen: NAD  Abd: + BS, soft, nontender, nondistended, no rebound or guarding  Incision clean, dry and intact  uterus firm at midline  : lochia WNL  Extremities: no swelling or calf tenderness                          9.7    10.31 )-----------( 113      ( 23 May 2022 09:21 )             30.3

## 2022-05-26 ENCOUNTER — INPATIENT (INPATIENT)
Facility: HOSPITAL | Age: 37
LOS: 2 days | Discharge: HOME | DRG: 776 | End: 2022-05-29
Attending: OBSTETRICS & GYNECOLOGY | Admitting: OBSTETRICS & GYNECOLOGY
Payer: COMMERCIAL

## 2022-05-26 VITALS
TEMPERATURE: 99 F | SYSTOLIC BLOOD PRESSURE: 147 MMHG | DIASTOLIC BLOOD PRESSURE: 87 MMHG | HEIGHT: 62 IN | OXYGEN SATURATION: 99 % | RESPIRATION RATE: 18 BRPM | HEART RATE: 84 BPM

## 2022-05-26 LAB
ALBUMIN SERPL ELPH-MCNC: 3.3 G/DL — SIGNIFICANT CHANGE UP (ref 3.3–5)
ALP SERPL-CCNC: 131 U/L — HIGH (ref 40–120)
ALT FLD-CCNC: 14 U/L — SIGNIFICANT CHANGE UP (ref 10–45)
ANION GAP SERPL CALC-SCNC: 12 MMOL/L — SIGNIFICANT CHANGE UP (ref 5–17)
APTT BLD: 28.3 SEC — SIGNIFICANT CHANGE UP (ref 27.5–35.5)
AST SERPL-CCNC: 17 U/L — SIGNIFICANT CHANGE UP (ref 10–40)
BASOPHILS # BLD AUTO: 0.01 K/UL — SIGNIFICANT CHANGE UP (ref 0–0.2)
BASOPHILS NFR BLD AUTO: 0.2 % — SIGNIFICANT CHANGE UP (ref 0–2)
BILIRUB SERPL-MCNC: 0.2 MG/DL — SIGNIFICANT CHANGE UP (ref 0.2–1.2)
BUN SERPL-MCNC: 10 MG/DL — SIGNIFICANT CHANGE UP (ref 7–23)
CALCIUM SERPL-MCNC: 8.7 MG/DL — SIGNIFICANT CHANGE UP (ref 8.4–10.5)
CHLORIDE SERPL-SCNC: 111 MMOL/L — HIGH (ref 96–108)
CO2 SERPL-SCNC: 21 MMOL/L — LOW (ref 22–31)
CREAT SERPL-MCNC: 0.58 MG/DL — SIGNIFICANT CHANGE UP (ref 0.5–1.3)
D DIMER BLD IA.RAPID-MCNC: 1944 NG/ML DDU — HIGH
EGFR: 121 ML/MIN/1.73M2 — SIGNIFICANT CHANGE UP
EOSINOPHIL # BLD AUTO: 0.2 K/UL — SIGNIFICANT CHANGE UP (ref 0–0.5)
EOSINOPHIL NFR BLD AUTO: 3.3 % — SIGNIFICANT CHANGE UP (ref 0–6)
GLUCOSE SERPL-MCNC: 84 MG/DL — SIGNIFICANT CHANGE UP (ref 70–99)
HCT VFR BLD CALC: 27.5 % — LOW (ref 34.5–45)
HGB BLD-MCNC: 8.9 G/DL — LOW (ref 11.5–15.5)
IMM GRANULOCYTES NFR BLD AUTO: 1 % — SIGNIFICANT CHANGE UP (ref 0–1.5)
INR BLD: 0.98 — SIGNIFICANT CHANGE UP (ref 0.88–1.16)
LYMPHOCYTES # BLD AUTO: 0.83 K/UL — LOW (ref 1–3.3)
LYMPHOCYTES # BLD AUTO: 13.9 % — SIGNIFICANT CHANGE UP (ref 13–44)
MCHC RBC-ENTMCNC: 29.1 PG — SIGNIFICANT CHANGE UP (ref 27–34)
MCHC RBC-ENTMCNC: 32.4 GM/DL — SIGNIFICANT CHANGE UP (ref 32–36)
MCV RBC AUTO: 89.9 FL — SIGNIFICANT CHANGE UP (ref 80–100)
MONOCYTES # BLD AUTO: 0.28 K/UL — SIGNIFICANT CHANGE UP (ref 0–0.9)
MONOCYTES NFR BLD AUTO: 4.7 % — SIGNIFICANT CHANGE UP (ref 2–14)
NEUTROPHILS # BLD AUTO: 4.61 K/UL — SIGNIFICANT CHANGE UP (ref 1.8–7.4)
NEUTROPHILS NFR BLD AUTO: 76.9 % — SIGNIFICANT CHANGE UP (ref 43–77)
NRBC # BLD: 0 /100 WBCS — SIGNIFICANT CHANGE UP (ref 0–0)
NT-PROBNP SERPL-SCNC: 651 PG/ML — HIGH (ref 0–300)
PLATELET # BLD AUTO: 205 K/UL — SIGNIFICANT CHANGE UP (ref 150–400)
POTASSIUM SERPL-MCNC: 3.9 MMOL/L — SIGNIFICANT CHANGE UP (ref 3.5–5.3)
POTASSIUM SERPL-SCNC: 3.9 MMOL/L — SIGNIFICANT CHANGE UP (ref 3.5–5.3)
PROT SERPL-MCNC: 5.9 G/DL — LOW (ref 6–8.3)
PROTHROM AB SERPL-ACNC: 11.7 SEC — SIGNIFICANT CHANGE UP (ref 10.5–13.4)
RBC # BLD: 3.06 M/UL — LOW (ref 3.8–5.2)
RBC # FLD: 16 % — HIGH (ref 10.3–14.5)
SARS-COV-2 RNA SPEC QL NAA+PROBE: NEGATIVE — SIGNIFICANT CHANGE UP
SODIUM SERPL-SCNC: 144 MMOL/L — SIGNIFICANT CHANGE UP (ref 135–145)
TROPONIN T SERPL-MCNC: 0.01 NG/ML — SIGNIFICANT CHANGE UP (ref 0–0.01)
WBC # BLD: 5.99 K/UL — SIGNIFICANT CHANGE UP (ref 3.8–10.5)
WBC # FLD AUTO: 5.99 K/UL — SIGNIFICANT CHANGE UP (ref 3.8–10.5)

## 2022-05-26 PROCEDURE — 71045 X-RAY EXAM CHEST 1 VIEW: CPT | Mod: 26

## 2022-05-26 PROCEDURE — 71275 CT ANGIOGRAPHY CHEST: CPT | Mod: 26,MA

## 2022-05-26 PROCEDURE — 99285 EMERGENCY DEPT VISIT HI MDM: CPT | Mod: 25

## 2022-05-26 RX ORDER — IBUPROFEN 200 MG
600 TABLET ORAL ONCE
Refills: 0 | Status: COMPLETED | OUTPATIENT
Start: 2022-05-26 | End: 2022-05-26

## 2022-05-26 RX ORDER — ACETAMINOPHEN 500 MG
650 TABLET ORAL EVERY 6 HOURS
Refills: 0 | Status: DISCONTINUED | OUTPATIENT
Start: 2022-05-26 | End: 2022-05-29

## 2022-05-26 RX ORDER — IBUPROFEN 200 MG
400 TABLET ORAL EVERY 6 HOURS
Refills: 0 | Status: DISCONTINUED | OUTPATIENT
Start: 2022-05-26 | End: 2022-05-29

## 2022-05-26 RX ADMIN — Medication 600 MILLIGRAM(S): at 17:08

## 2022-05-26 RX ADMIN — Medication 650 MILLIGRAM(S): at 19:15

## 2022-05-26 NOTE — ED PROVIDER NOTE - OBJECTIVE STATEMENT
post partum from c section on Sunday, here with chest tightness/ mild sob since last night. Feels like she can't take full breath and coughs with deep breathing. Denies fever/chills, leg pain. Does have leg swelling bilaterally that seems unchanged from delivery. Symptoms initially worse with sitting back, now happen any position. Taking tylenol/ motrin.

## 2022-05-26 NOTE — ED ADULT TRIAGE NOTE - BRAND OF COVID-19 VACCINATION
Pfizer dose 1, 2, and 3 O-Z Plasty Text: The defect edges were debeveled with a #15 scalpel blade.  Given the location of the defect, shape of the defect and the proximity to free margins an O-Z plasty (double transposition flap) was deemed most appropriate.  Using a sterile surgical marker, the appropriate transposition flaps were drawn incorporating the defect and placing the expected incisions within the relaxed skin tension lines where possible.    The area thus outlined was incised deep to adipose tissue with a #15 scalpel blade.  The skin margins were undermined to an appropriate distance in all directions utilizing iris scissors.  Hemostasis was achieved with electrocautery.  The flaps were then transposed into place, one clockwise and the other counterclockwise, and anchored with interrupted buried subcutaneous sutures.

## 2022-05-26 NOTE — H&P ADULT - NSHPLABSRESULTS_GEN_ALL_CORE
LABS:                        8.9    5.99  )-----------( 205      ( 26 May 2022 14:55 )             27.5     05-26    144  |  111<H>  |  10  ----------------------------<  84  3.9   |  21<L>  |  0.58    Ca    8.7      26 May 2022 14:55    TPro  5.9<L>  /  Alb  3.3  /  TBili  0.2  /  DBili  x   /  AST  17  /  ALT  14  /  AlkPhos  131<H>  05-26    PT/INR - ( 26 May 2022 14:55 )   PT: 11.7 sec;   INR: 0.98          PTT - ( 26 May 2022 14:55 )  PTT:28.3 sec        RADIOLOGY & ADDITIONAL STUDIES:  ACC: [id number] EXAM:  CT ANGIO CHEST PULM ART [facility]                          PROCEDURE DATE:  05/26/2022          INTERPRETATION:  CTA (CT angiography) of the CHEST dated 5/26/2022 4:35 PM    INDICATION: post partum c section with chest tight/sob r/o pe    TECHNIQUE: CT angiography of the chest was performed during bolus   injection of intravenous contrast.  Post-processing including the   production of axial and coronal and sagittal multiplanar reformatted   images and coronal maximum intensity projections (MIPs) was performed.    CONTRAST USAGE:  CONTRAST/COMPLICATIONS:  IV Contrast: Isovue 370  78 cc administered  Oral Contrast: NONE  Complications: None reported at time of study completion    PRIOR STUDY: None.    FINDINGS: No visualized PE. The main pulmonary artery is somewhat   prominent measuring 3.4 cm. The heart is normal in size.  No pericardial   effusion is seen.  The great vessels are unremarkable.  No mediastinal or   hilar lymphadenopathy is seen.    Evaluation of the pulmonary parenchyma demonstrates subsegmental   atelectasis right lower lobe..  Trace pleural fluid at the bilateral   posterior costophrenic sulci..    Limited evaluation of the upper abdomen demonstrates no abnormality.    Evaluation of the osseous structures demonstrates no significant   abnormality..      IMPRESSION: No visualized PE.          --- End of Report ---

## 2022-05-26 NOTE — H&P ADULT - ASSESSMENT
38 yo  POD5 s/p primary c/s  presents complaining of chest tightness, cough on inspiration and dull headache x1 day with newly mildly elevated BP in the ER  -Cycle BP every 15mins   -proBNP 651, hx of PVC   -Ordered TTE, pending until the AM due to TTE techs off til the AM  -low threshold to start Mg for elevated BP but waiting on TTE per attending Dr Foster   -Monitor preeclampsia sx  -Dr Layton on call tonight, will sign out to night team   -Pt to staying on OB floor for observation and TTE.

## 2022-05-26 NOTE — CONSULT NOTE ADULT - ASSESSMENT
38 yo  POD5 s/p primary c/s  presents complaining of chest tightness, cough on inspiration and dull headache x1 day with newly mildly elevated BP in the ER  -Cycle BP every 15mins   -proBNP 651, hx of PVC, please obtain TTE   -low threshold to start Mg for elevated BP but waiting on TTE per attending Dr Foster   -Possibly d/c on anti HTN medications depending on BP throughout ER stay   -Monitor preeclampsia sx  -Dr Layton on call tonight, will sign out to night team   -Signed out to Dr Ansari   -Dr Foster in agreement w/ plan

## 2022-05-26 NOTE — ED ADULT TRIAGE NOTE - TEMPERATURE IN FAHRENHEIT (DEGREES F)
Times of Days  am  Pm (before 6 pm)       Medication Tablet Size Number of Tablets/Capsules Total Daily Dosage    lamotrigine  200 mg  1    1                                              lamotrigine   150 mg  0    0                               Times of Days  am  noon  pm     Medication Tablet Size Number of Tablets/Capsules Total Daily Dosage    levetiracetam (Keppra)  750 mg  2   2     2      wk 1  750  2    1    2      wk 2 and thereafter  750  2    0    2                           - Stop taking lamotrigine 150 mg tabs    Carry this with you at all times.  CONTINUE TAKING YOUR OTHER MEDICATIONS AS PREVIOUSLY DIRECTED.      * * *Do not store medications in the bathroom.  Keep medications away from children!* * *      99.2

## 2022-05-26 NOTE — ED ADULT NURSE REASSESSMENT NOTE - NS ED NURSE REASSESS COMMENT FT1
Called postpartum for report, Spoke with nurse Em, room is not ready, she will call back for report.

## 2022-05-26 NOTE — ED ADULT TRIAGE NOTE - CHIEF COMPLAINT QUOTE
Pt presents to ED c/o chest pain radiates to back, associated with SOB on inspiration. Pt has  on , pregnancy with gestational diabetes. No delivery complications. Denies cardiac hx, hx of clots.

## 2022-05-26 NOTE — ED PROVIDER NOTE - CLINICAL SUMMARY MEDICAL DECISION MAKING FREE TEXT BOX
post partum with chest tightness/sob. concern for infection, pe, acs, cardiomyopathy. labs/cxr/ekg/cta ordered. cxr neg for pneumonia, pneumothorax, widened mediastinum to suggest dissection.  troponin neg and ecg non ischemic making acs unlikely.  cta neg for pe. bnp elevated at 651. echo ordered but echo lab closed for day. gyn consulted. concerned about borderline elevated blood pressures, possible early/mild post partum cardiomyopathy. would like to admit to post partum unit for echo in am, possible mag infusion

## 2022-05-26 NOTE — H&P ADULT - NSHPPHYSICALEXAM_GEN_ALL_CORE
PHYSICAL EXAM:   Vital Signs Last 24 Hrs  T(C): 37.3 (26 May 2022 14:27), Max: 37.3 (26 May 2022 14:27)  T(F): 99.2 (26 May 2022 14:27), Max: 99.2 (26 May 2022 14:27)  HR: 70 (26 May 2022 17:02) (70 - 84)  BP: 133/74 (26 May 2022 17:02) (133/74 - 153/86)  RR: 16 (26 May 2022 17:02) (16 - 18)  SpO2: 98% (26 May 2022 17:02) (98% - 99%)    **************************  Constitutional: Alert & Oriented x3, No acute distress  Respiratory: Clear to ausculation bilaterally; no wheezing, rhonchi, or crackles  Cardiovascular: regular rate and rhythm, no murmurs, or gallops  Gastrointestinal: soft, non tender, positive bowel sounds, no rebound or guarding   Pelvic exam:   Extremities: no calf tenderness or swelling

## 2022-05-26 NOTE — ED PROVIDER NOTE - MUSCULOSKELETAL, MLM
Spine appears normal, range of motion is not limited, no muscle or joint tenderness. 1+ non pitting edema bilateral lower extremities

## 2022-05-26 NOTE — H&P ADULT - HISTORY OF PRESENT ILLNESS
I have personally performed a face to face diagnostic evaluation on this patient. I have reviewed the ACP note and agree with the history, exam and plan of care, except as noted. 38 yo  on POD5 s/p primary c/s on  for arrest of descent.  Pt comes to the ED today complaining of chest pain on inspiration and movement since last night.  Pt states she also started to notice a dry cough that gets worse upon inspiration.  Pt also notes a dull headache that started since she came to the ED. Pt states she has never had elevated BP in the past and these symptoms all came on suddenly overnight.  Pt does not admit to having scotoma, blurry vision, dizziness, RUQ pain, elevated pressures in the past, LE edema.    Pt denies fever, chills, chest pain, SOB, abdominal pain, nausea, vomiting, vaginal bleeding

## 2022-05-27 LAB
ALBUMIN SERPL ELPH-MCNC: 3.6 G/DL — SIGNIFICANT CHANGE UP (ref 3.3–5)
ALP SERPL-CCNC: 129 U/L — HIGH (ref 40–120)
ALT FLD-CCNC: 15 U/L — SIGNIFICANT CHANGE UP (ref 10–45)
ANION GAP SERPL CALC-SCNC: 11 MMOL/L — SIGNIFICANT CHANGE UP (ref 5–17)
ANION GAP SERPL CALC-SCNC: 13 MMOL/L — SIGNIFICANT CHANGE UP (ref 5–17)
APTT BLD: 28.5 SEC — SIGNIFICANT CHANGE UP (ref 27.5–35.5)
AST SERPL-CCNC: 17 U/L — SIGNIFICANT CHANGE UP (ref 10–40)
BILIRUB SERPL-MCNC: 0.2 MG/DL — SIGNIFICANT CHANGE UP (ref 0.2–1.2)
BUN SERPL-MCNC: 10 MG/DL — SIGNIFICANT CHANGE UP (ref 7–23)
BUN SERPL-MCNC: 8 MG/DL — SIGNIFICANT CHANGE UP (ref 7–23)
CALCIUM SERPL-MCNC: 8.9 MG/DL — SIGNIFICANT CHANGE UP (ref 8.4–10.5)
CALCIUM SERPL-MCNC: 9.2 MG/DL — SIGNIFICANT CHANGE UP (ref 8.4–10.5)
CHLORIDE SERPL-SCNC: 107 MMOL/L — SIGNIFICANT CHANGE UP (ref 96–108)
CHLORIDE SERPL-SCNC: 110 MMOL/L — HIGH (ref 96–108)
CO2 SERPL-SCNC: 20 MMOL/L — LOW (ref 22–31)
CO2 SERPL-SCNC: 22 MMOL/L — SIGNIFICANT CHANGE UP (ref 22–31)
CREAT SERPL-MCNC: 0.58 MG/DL — SIGNIFICANT CHANGE UP (ref 0.5–1.3)
CREAT SERPL-MCNC: 0.59 MG/DL — SIGNIFICANT CHANGE UP (ref 0.5–1.3)
EGFR: 120 ML/MIN/1.73M2 — SIGNIFICANT CHANGE UP
EGFR: 121 ML/MIN/1.73M2 — SIGNIFICANT CHANGE UP
FIBRINOGEN PPP-MCNC: 480 MG/DL — HIGH (ref 258–438)
GLUCOSE SERPL-MCNC: 81 MG/DL — SIGNIFICANT CHANGE UP (ref 70–99)
GLUCOSE SERPL-MCNC: 87 MG/DL — SIGNIFICANT CHANGE UP (ref 70–99)
HCT VFR BLD CALC: 28.4 % — LOW (ref 34.5–45)
HCT VFR BLD CALC: 31 % — LOW (ref 34.5–45)
HGB BLD-MCNC: 10.2 G/DL — LOW (ref 11.5–15.5)
HGB BLD-MCNC: 9.2 G/DL — LOW (ref 11.5–15.5)
INR BLD: 0.97 — SIGNIFICANT CHANGE UP (ref 0.88–1.16)
LDH SERPL L TO P-CCNC: 200 U/L — SIGNIFICANT CHANGE UP (ref 50–242)
MAGNESIUM SERPL-MCNC: 1.8 MG/DL — SIGNIFICANT CHANGE UP (ref 1.6–2.6)
MCHC RBC-ENTMCNC: 29.3 PG — SIGNIFICANT CHANGE UP (ref 27–34)
MCHC RBC-ENTMCNC: 29.7 PG — SIGNIFICANT CHANGE UP (ref 27–34)
MCHC RBC-ENTMCNC: 32.4 GM/DL — SIGNIFICANT CHANGE UP (ref 32–36)
MCHC RBC-ENTMCNC: 32.9 GM/DL — SIGNIFICANT CHANGE UP (ref 32–36)
MCV RBC AUTO: 90.4 FL — SIGNIFICANT CHANGE UP (ref 80–100)
MCV RBC AUTO: 90.4 FL — SIGNIFICANT CHANGE UP (ref 80–100)
NRBC # BLD: 0 /100 WBCS — SIGNIFICANT CHANGE UP (ref 0–0)
NT-PROBNP SERPL-SCNC: 763 PG/ML — HIGH (ref 0–300)
PHOSPHATE SERPL-MCNC: 3.3 MG/DL — SIGNIFICANT CHANGE UP (ref 2.5–4.5)
PLATELET # BLD AUTO: 229 K/UL — SIGNIFICANT CHANGE UP (ref 150–400)
PLATELET # BLD AUTO: 245 K/UL — SIGNIFICANT CHANGE UP (ref 150–400)
POTASSIUM SERPL-MCNC: 4 MMOL/L — SIGNIFICANT CHANGE UP (ref 3.5–5.3)
POTASSIUM SERPL-MCNC: 4.1 MMOL/L — SIGNIFICANT CHANGE UP (ref 3.5–5.3)
POTASSIUM SERPL-SCNC: 4 MMOL/L — SIGNIFICANT CHANGE UP (ref 3.5–5.3)
POTASSIUM SERPL-SCNC: 4.1 MMOL/L — SIGNIFICANT CHANGE UP (ref 3.5–5.3)
PROT SERPL-MCNC: 6.5 G/DL — SIGNIFICANT CHANGE UP (ref 6–8.3)
PROTHROM AB SERPL-ACNC: 11.5 SEC — SIGNIFICANT CHANGE UP (ref 10.5–13.4)
RBC # BLD: 3.14 M/UL — LOW (ref 3.8–5.2)
RBC # BLD: 3.43 M/UL — LOW (ref 3.8–5.2)
RBC # FLD: 15.9 % — HIGH (ref 10.3–14.5)
RBC # FLD: 15.9 % — HIGH (ref 10.3–14.5)
SODIUM SERPL-SCNC: 140 MMOL/L — SIGNIFICANT CHANGE UP (ref 135–145)
SODIUM SERPL-SCNC: 143 MMOL/L — SIGNIFICANT CHANGE UP (ref 135–145)
URATE SERPL-MCNC: 4.9 MG/DL — SIGNIFICANT CHANGE UP (ref 2.5–7)
WBC # BLD: 6.41 K/UL — SIGNIFICANT CHANGE UP (ref 3.8–10.5)
WBC # BLD: 6.85 K/UL — SIGNIFICANT CHANGE UP (ref 3.8–10.5)
WBC # FLD AUTO: 6.41 K/UL — SIGNIFICANT CHANGE UP (ref 3.8–10.5)
WBC # FLD AUTO: 6.85 K/UL — SIGNIFICANT CHANGE UP (ref 3.8–10.5)

## 2022-05-27 PROCEDURE — 93306 TTE W/DOPPLER COMPLETE: CPT | Mod: 26

## 2022-05-27 RX ORDER — NIFEDIPINE 30 MG
30 TABLET, EXTENDED RELEASE 24 HR ORAL ONCE
Refills: 0 | Status: DISCONTINUED | OUTPATIENT
Start: 2022-05-27 | End: 2022-05-27

## 2022-05-27 RX ORDER — HYDRALAZINE HCL 50 MG
10 TABLET ORAL ONCE
Refills: 0 | Status: DISCONTINUED | OUTPATIENT
Start: 2022-05-27 | End: 2022-05-28

## 2022-05-27 RX ORDER — FUROSEMIDE 40 MG
20 TABLET ORAL ONCE
Refills: 0 | Status: COMPLETED | OUTPATIENT
Start: 2022-05-27 | End: 2022-05-27

## 2022-05-27 RX ORDER — FUROSEMIDE 40 MG
10 TABLET ORAL ONCE
Refills: 0 | Status: DISCONTINUED | OUTPATIENT
Start: 2022-05-27 | End: 2022-05-27

## 2022-05-27 RX ADMIN — Medication 650 MILLIGRAM(S): at 02:00

## 2022-05-27 RX ADMIN — Medication 400 MILLIGRAM(S): at 18:18

## 2022-05-27 RX ADMIN — Medication 650 MILLIGRAM(S): at 15:33

## 2022-05-27 RX ADMIN — Medication 650 MILLIGRAM(S): at 20:51

## 2022-05-27 RX ADMIN — Medication 400 MILLIGRAM(S): at 13:00

## 2022-05-27 RX ADMIN — Medication 650 MILLIGRAM(S): at 03:46

## 2022-05-27 RX ADMIN — Medication 400 MILLIGRAM(S): at 12:26

## 2022-05-27 RX ADMIN — Medication 400 MILLIGRAM(S): at 06:30

## 2022-05-27 RX ADMIN — Medication 650 MILLIGRAM(S): at 08:35

## 2022-05-27 RX ADMIN — Medication 650 MILLIGRAM(S): at 16:16

## 2022-05-27 RX ADMIN — Medication 400 MILLIGRAM(S): at 18:43

## 2022-05-27 RX ADMIN — Medication 650 MILLIGRAM(S): at 09:00

## 2022-05-27 RX ADMIN — Medication 20 MILLIGRAM(S): at 13:37

## 2022-05-27 RX ADMIN — Medication 650 MILLIGRAM(S): at 21:11

## 2022-05-27 NOTE — PROGRESS NOTE ADULT - SUBJECTIVE AND OBJECTIVE BOX
steven wolfe re patient  for small dose of lasix for now - may help with htn and also decrease the posITional SOB  ICU Vital Signs Last 24 Hrs  T(C): 37.3 (27 May 2022 06:03), Max: 37.3 (26 May 2022 14:27)  T(F): 99.2 (27 May 2022 06:03), Max: 99.2 (26 May 2022 14:27)  HR: 58 (27 May 2022 11:15) (56 - 84)  BP: 136/78 (27 May 2022 11:15) (127/66 - 151/82)    RR: 18 (27 May 2022 11:15) (16 - 18)  SpO2: 96% (27 May 2022 11:15) (87% - 100%)  ANOTHER BNP TONIGHT   Cardiology to f/u in am

## 2022-05-27 NOTE — CHART NOTE - NSCHARTNOTEFT_GEN_A_CORE
PT evaluated at bedside after having elevated BP of 156/76 HR 56. Pt endorses dull HA that has been persistent throughout the day. Pt also endorses scotoma earlier in the day which has since resolved. She also endorses sob when she lays back  GEN: NAD, malaised  Lungs: CTABL  abd: soft, umbilicus 2cm below umbilicus  EXT: wnl  d/w Dr. Arias, per attending repeat vitals and STAT Full labs  DC PGY1

## 2022-05-27 NOTE — CONSULT NOTE ADULT - ASSESSMENT
38 yo  on POD5 s/p primary c/s on  for arrest of descent. She presents now with dyspnea with minimal activities that started yesterday associated with chest tightness.  workup so far negative for PE or pulmonary infiltrate, CT shows small pleural effusions that are unlikely to be contributing to her symptoms. proBNP elevated to 600  i/s/o hypertension with normal biventricular size and function, no pulmonary hypertension, and no significant valvular disease.     #Dyspnea of unclear etiology  - f/u final echo read  - give lasix 20mg PO daily for now  - if BP remain elevated, may consider starting labetalol  - cardiology will follow with you

## 2022-05-28 LAB
ALBUMIN SERPL ELPH-MCNC: 3.6 G/DL — SIGNIFICANT CHANGE UP (ref 3.3–5)
ALP SERPL-CCNC: 120 U/L — SIGNIFICANT CHANGE UP (ref 40–120)
ALT FLD-CCNC: 17 U/L — SIGNIFICANT CHANGE UP (ref 10–45)
ANION GAP SERPL CALC-SCNC: 13 MMOL/L — SIGNIFICANT CHANGE UP (ref 5–17)
AST SERPL-CCNC: 21 U/L — SIGNIFICANT CHANGE UP (ref 10–40)
BASOPHILS # BLD AUTO: 0.02 K/UL — SIGNIFICANT CHANGE UP (ref 0–0.2)
BASOPHILS NFR BLD AUTO: 0.3 % — SIGNIFICANT CHANGE UP (ref 0–2)
BILIRUB SERPL-MCNC: 0.2 MG/DL — SIGNIFICANT CHANGE UP (ref 0.2–1.2)
BUN SERPL-MCNC: 6 MG/DL — LOW (ref 7–23)
CALCIUM SERPL-MCNC: 8.2 MG/DL — LOW (ref 8.4–10.5)
CHLORIDE SERPL-SCNC: 104 MMOL/L — SIGNIFICANT CHANGE UP (ref 96–108)
CO2 SERPL-SCNC: 23 MMOL/L — SIGNIFICANT CHANGE UP (ref 22–31)
CREAT SERPL-MCNC: 0.48 MG/DL — LOW (ref 0.5–1.3)
EGFR: 127 ML/MIN/1.73M2 — SIGNIFICANT CHANGE UP
EOSINOPHIL # BLD AUTO: 0.19 K/UL — SIGNIFICANT CHANGE UP (ref 0–0.5)
EOSINOPHIL NFR BLD AUTO: 2.8 % — SIGNIFICANT CHANGE UP (ref 0–6)
FIBRINOGEN PPP-MCNC: 446 MG/DL — HIGH (ref 258–438)
GLUCOSE SERPL-MCNC: 85 MG/DL — SIGNIFICANT CHANGE UP (ref 70–99)
HCT VFR BLD CALC: 30.6 % — LOW (ref 34.5–45)
HGB BLD-MCNC: 10.1 G/DL — LOW (ref 11.5–15.5)
IMM GRANULOCYTES NFR BLD AUTO: 2.6 % — HIGH (ref 0–1.5)
LDH SERPL L TO P-CCNC: 220 U/L — SIGNIFICANT CHANGE UP (ref 50–242)
LYMPHOCYTES # BLD AUTO: 1.36 K/UL — SIGNIFICANT CHANGE UP (ref 1–3.3)
LYMPHOCYTES # BLD AUTO: 19.9 % — SIGNIFICANT CHANGE UP (ref 13–44)
MAGNESIUM SERPL-MCNC: 4.7 MG/DL — HIGH (ref 1.6–2.6)
MAGNESIUM SERPL-MCNC: 4.9 MG/DL — HIGH (ref 1.6–2.6)
MAGNESIUM SERPL-MCNC: 5.3 MG/DL — HIGH (ref 1.6–2.6)
MCHC RBC-ENTMCNC: 29.4 PG — SIGNIFICANT CHANGE UP (ref 27–34)
MCHC RBC-ENTMCNC: 33 GM/DL — SIGNIFICANT CHANGE UP (ref 32–36)
MCV RBC AUTO: 89.2 FL — SIGNIFICANT CHANGE UP (ref 80–100)
MONOCYTES # BLD AUTO: 0.47 K/UL — SIGNIFICANT CHANGE UP (ref 0–0.9)
MONOCYTES NFR BLD AUTO: 6.9 % — SIGNIFICANT CHANGE UP (ref 2–14)
NEUTROPHILS # BLD AUTO: 4.63 K/UL — SIGNIFICANT CHANGE UP (ref 1.8–7.4)
NEUTROPHILS NFR BLD AUTO: 67.5 % — SIGNIFICANT CHANGE UP (ref 43–77)
NRBC # BLD: 0 /100 WBCS — SIGNIFICANT CHANGE UP (ref 0–0)
NRBC # BLD: 0 /100 WBCS — SIGNIFICANT CHANGE UP (ref 0–0)
PLATELET # BLD AUTO: 247 K/UL — SIGNIFICANT CHANGE UP (ref 150–400)
POTASSIUM SERPL-MCNC: 3.4 MMOL/L — LOW (ref 3.5–5.3)
POTASSIUM SERPL-SCNC: 3.4 MMOL/L — LOW (ref 3.5–5.3)
PROT SERPL-MCNC: 6.5 G/DL — SIGNIFICANT CHANGE UP (ref 6–8.3)
RBC # BLD: 3.43 M/UL — LOW (ref 3.8–5.2)
RBC # FLD: 15.9 % — HIGH (ref 10.3–14.5)
SODIUM SERPL-SCNC: 140 MMOL/L — SIGNIFICANT CHANGE UP (ref 135–145)
TSH SERPL-MCNC: 5.38 UIU/ML — HIGH (ref 0.27–4.2)
URATE SERPL-MCNC: 5.4 MG/DL — SIGNIFICANT CHANGE UP (ref 2.5–7)
WBC # BLD: 6.42 K/UL — SIGNIFICANT CHANGE UP (ref 3.8–10.5)
WBC # FLD AUTO: 6.42 K/UL — SIGNIFICANT CHANGE UP (ref 3.8–10.5)

## 2022-05-28 PROCEDURE — 99222 1ST HOSP IP/OBS MODERATE 55: CPT | Mod: GC

## 2022-05-28 RX ORDER — MAGNESIUM SULFATE 500 MG/ML
2 VIAL (ML) INJECTION
Qty: 40 | Refills: 0 | Status: DISCONTINUED | OUTPATIENT
Start: 2022-05-28 | End: 2022-05-29

## 2022-05-28 RX ORDER — MAGNESIUM SULFATE 500 MG/ML
4 VIAL (ML) INJECTION ONCE
Refills: 0 | Status: COMPLETED | OUTPATIENT
Start: 2022-05-28 | End: 2022-05-28

## 2022-05-28 RX ORDER — LABETALOL HCL 100 MG
100 TABLET ORAL EVERY 12 HOURS
Refills: 0 | Status: DISCONTINUED | OUTPATIENT
Start: 2022-05-28 | End: 2022-05-29

## 2022-05-28 RX ORDER — SODIUM CHLORIDE 9 MG/ML
1000 INJECTION, SOLUTION INTRAVENOUS
Refills: 0 | Status: DISCONTINUED | OUTPATIENT
Start: 2022-05-28 | End: 2022-05-29

## 2022-05-28 RX ADMIN — Medication 400 MILLIGRAM(S): at 19:24

## 2022-05-28 RX ADMIN — Medication 400 MILLIGRAM(S): at 01:00

## 2022-05-28 RX ADMIN — Medication 650 MILLIGRAM(S): at 21:12

## 2022-05-28 RX ADMIN — Medication 400 MILLIGRAM(S): at 18:29

## 2022-05-28 RX ADMIN — Medication 1 CAPSULE(S): at 08:45

## 2022-05-28 RX ADMIN — Medication 650 MILLIGRAM(S): at 20:21

## 2022-05-28 RX ADMIN — Medication 1 CAPSULE(S): at 09:15

## 2022-05-28 RX ADMIN — Medication 400 MILLIGRAM(S): at 00:06

## 2022-05-28 RX ADMIN — Medication 300 GRAM(S): at 00:46

## 2022-05-28 RX ADMIN — Medication 650 MILLIGRAM(S): at 14:30

## 2022-05-28 RX ADMIN — Medication 50 GM/HR: at 20:23

## 2022-05-28 RX ADMIN — Medication 650 MILLIGRAM(S): at 13:30

## 2022-05-28 RX ADMIN — Medication 100 MILLIGRAM(S): at 18:08

## 2022-05-28 RX ADMIN — Medication 50 GM/HR: at 01:12

## 2022-05-28 RX ADMIN — SODIUM CHLORIDE 75 MILLILITER(S): 9 INJECTION, SOLUTION INTRAVENOUS at 11:48

## 2022-05-28 NOTE — PROGRESS NOTE ADULT - SUBJECTIVE AND OBJECTIVE BOX
Patient evaluated at bedside this morning, resting comfortable in bed. ON pt endorsed HA, scotoma and SOB, pt started on Mg ON.  She reports pain is well controlled.  She endorses slight HA, denies \, dizziness, chest pain, palpitations, shortness of breathe, nausea, vomiting or heavy vaginal bleeding.  She has been ambulating without assistance, voiding spontaneously, passing gas, tolerating regular diet and is breastfeeding.    Physical Exam:  Vital Signs Last 24 Hrs  T(C): 36.8 (28 May 2022 03:30), Max: 36.8 (27 May 2022 18:00)  T(F): 98.2 (28 May 2022 03:30), Max: 98.2 (27 May 2022 18:00)  HR: 65 (28 May 2022 03:30) (56 - 78)  BP: 126/82 (28 May 2022 03:30) (125/79 - 164/80)  BP(mean): --  RR: 18 (27 May 2022 22:05) (18 - 18)  SpO2: 97% (28 May 2022 03:30) (96% - 99%)    GA: NAD, A+0 x 3  Abd: + BS, soft, nontender, nondistended, no rebound or guarding, incision clean, dry and intact, uterus firm at midline and below umbilicus  : lochia WNL  Extremities: no swelling or calf tenderness   Lungs: no inc WOB                          10.2   6.85  )-----------( 245      ( 27 May 2022 23:10 )             31.0     05-27    140  |  107  |  8   ----------------------------<  87  4.1   |  20<L>  |  0.59    Ca    9.2      27 May 2022 23:10  Phos  3.3     05-27  Mg     1.8     05-27    TPro  6.5  /  Alb  3.6  /  TBili  0.2  /  DBili  x   /  AST  17  /  ALT  15  /  AlkPhos  129<H>  05-27      PT/INR - ( 27 May 2022 23:10 )   PT: 11.5 sec;   INR: 0.97          PTT - ( 27 May 2022 23:10 )  PTT:28.5 sec

## 2022-05-28 NOTE — PROGRESS NOTE ADULT - SUBJECTIVE AND OBJECTIVE BOX
Patient evaluated at bedside.   She reports pain is well controlled with OPM.  She has been ambulating without assistance, voiding spontaneously, passing gas, tolerating regular diet and is breastfeeding.    She denies HA, dizziness, CP, palpitations, SOB, n/v, or heavy vaginal bleeding.    Physical Exam:  vs reviewed  Gen: NAD  Abd: + BS, soft, nontender, nondistended, no rebound or guarding  Incision clean, dry and intact  uterus firm at midline,   fb below umbilicus  : lochia WNL  Extremities: no swelling or calf tenderness    pod7, readmitted due to chest pain, sob  f/u cardio rec  f/u nephro rec  ref to endocrine for consult  on mag  will cont inhouse observation

## 2022-05-28 NOTE — CHART NOTE - NSCHARTNOTEFT_GEN_A_CORE
Pt evaluated at bedside. She endorses dull HA and Scotoma. [facility] was then called and discussion had about starting Magnesium given pt endorsing HA, scotoma and SOB. Echo done this morning shows normal LV and RV function, dilated L atrium, mild-to-moderate MR, no pulmonary HTN, no pericardial effusions. M given clearance for Mg to start. Pt to have full labs with AM Mg drawn and nephrology and cardiology to be consulted in the AM.

## 2022-05-28 NOTE — CHART NOTE - NSCHARTNOTEFT_GEN_A_CORE
Patient evaluated at bedside for clinical magnesium check. Serum magnesium to be drawn at 1900.  She reports mild HA but just received motrin. She denies visual disturbances/scotoma, and RUQ pain. Also denies chest pain, palpitations, SOB, inc WOB, abd/epigastric pain,  nausea/vomiting. Pain well controlled.     T(C): 36.6 (05-28-22 @ 18:00), Max: 36.8 (05-28-22 @ 08:00)  HR: 77 (05-28-22 @ 18:00) (77 - 88)  BP: 141/91 (05-28-22 @ 18:00) (134/88 - 145/89)  RR: 18 (05-28-22 @ 18:00) (18 - 19)  SpO2: 96% (05-28-22 @ 18:00) (96% - 99%)      05-27 @ 07:01  -  05-28 @ 07:00  --------------------------------------------------------  IN: 5835 mL / OUT: 4425 mL / NET: 1410 mL    05-28 @ 07:01  -  05-28 @ 18:38  --------------------------------------------------------  IN: 1125 mL / OUT: 4525 mL / NET: -3400 mL      Gen: NAD, AAOx3  CV: RRR, no M/R/G, normal to mild range BP  Pulm: CTAB, no R/R/W  Abd: soft, nontender, no rebound or guarding, no epigastric tenderness, liver nonpalpable   : voiding  Ext: +1 edema lexis, no ankle clonus, 1+ upper extremity reflexes b/l                          10.1   6.42  )-----------( 247      ( 28 May 2022 08:35 )             30.6     05-28    140  |  104  |  6<L>  ----------------------------<  85  3.4<L>   |  23  |  0.48<L>    Ca    8.2<L>      28 May 2022 08:35  Phos  3.3     05-27  Mg     4.9     05-28    TPro  6.5  /  Alb  3.6  /  TBili  0.2  /  DBili  x   /  AST  21  /  ALT  17  /  AlkPhos  120  05-28    acetaminophen     Tablet .. 650 milliGRAM(s) Oral every 6 hours PRN  acetaminophen 300 mG/butalbital 50 mG/ caffeine 40 mG 1 Capsule(s) Oral every 6 hours PRN  ibuprofen  Tablet. 400 milliGRAM(s) Oral every 6 hours PRN  labetalol 100 milliGRAM(s) Oral every 12 hours  lactated ringers. 1000 milliLiter(s) IV Continuous <Continuous>  magnesium sulfate Infusion 2 Gm/Hr IV Continuous <Continuous>      05-27-22 @ 07:01  -  05-28-22 @ 07:00  --------------------------------------------------------  IN: 5835 mL / OUT: 4425 mL / NET: 1410 mL    05-28-22 @ 07:01 - 05-28-22 @ 18:38  --------------------------------------------------------  IN: 1125 mL / OUT: 4525 mL / NET: -3400 mL      A&P:  37y  s/p pCS for arrest of descent now readmitted for preeclampsia with severe features (BP, chest pain, SOB, HA)  No complaints currently. No severe range BP.  Antihypertensives: will start labetalol 100mg PO BID per nephrology recs  Mg level is pending    - Continue IV Magnesium @2G/hr until 5/29 at 0030  - Continue to monitor blood pressures  - Follow up on Magnesium serum levels.  - GI: reg diet  - : strict Is and Os.

## 2022-05-28 NOTE — PROGRESS NOTE ADULT - ATTENDING COMMENTS
-Pt seen and examined  -VSS, NAD, saturating well on RA, euvolemic on exam - Lungs CTA b/l, no LE edema; Subjectively feels breathing has improved, otherwise endorses mild HA, no visual changes  -BPs now well controlled, SBPs consistently 130s, currently on Labetalol 100 q12h, can likely d/c and monitor BP off meds as an outpatient; I do not feel her mild HA is related to her BP; Otherwise euvolemic on exam, no need for further diuresis  -Outpatient f/u w/ Dr. Brit Garcia  -Case d/w Patient and Mother    Loraine Swanson MD  Cardiology Attending

## 2022-05-28 NOTE — PROGRESS NOTE ADULT - SUBJECTIVE AND OBJECTIVE BOX
INTERVAL EVENTS:    PAST MEDICAL & SURGICAL HISTORY:  Gestational diabetes    No significant past surgical history        MEDICATIONS  (STANDING):  lactated ringers. 1000 milliLiter(s) (75 mL/Hr) IV Continuous <Continuous>  magnesium sulfate Infusion 2 Gm/Hr (50 mL/Hr) IV Continuous <Continuous>    MEDICATIONS  (PRN):  acetaminophen     Tablet .. 650 milliGRAM(s) Oral every 6 hours PRN Moderate Pain (4 - 6)  acetaminophen 300 mG/butalbital 50 mG/ caffeine 40 mG 1 Capsule(s) Oral every 6 hours PRN Headache  ibuprofen  Tablet. 400 milliGRAM(s) Oral every 6 hours PRN Moderate Pain (4 - 6)      Vital Signs Last 24 Hrs  T(C): 36.6 (28 May 2022 10:00), Max: 36.8 (27 May 2022 18:00)  T(F): 97.9 (28 May 2022 10:00), Max: 98.3 (28 May 2022 08:00)  HR: 88 (28 May 2022 10:00) (56 - 88)  BP: 145/89 (28 May 2022 10:00) (125/79 - 164/80)  BP(mean): 108 (28 May 2022 10:00) (108 - 108)  RR: 19 (28 May 2022 10:00) (18 - 19)  SpO2: 99% (28 May 2022 10:00) (96% - 99%)     PHYSICAL EXAM:  GEN: Awake, alert. NAD.   HEENT: NCAT, PERRL, EOMI. Mucosa moist. No JVD.  RESP: CTA b/l  CV: RRR. Normal S1/S2. No m/r/g.  ABD: Soft. NT/ND. BS+  EXT: Warm. No edema, clubbing, or cyanosis.   NEURO: AAOx3. No focal deficits.     LABS:                        10.1   6.42  )-----------( 247      ( 28 May 2022 08:35 )             30.6     05-28    140  |  104  |  6<L>  ----------------------------<  85  3.4<L>   |  23  |  0.48<L>    Ca    8.2<L>      28 May 2022 08:35  Phos  3.3     05-27  Mg     4.7     05-28    TPro  6.5  /  Alb  3.6  /  TBili  0.2  /  DBili  x   /  AST  21  /  ALT  17  /  AlkPhos  120  05-28    CARDIAC MARKERS ( 26 May 2022 14:55 )  x     / 0.01 ng/mL / x     / x     / x          PT/INR - ( 27 May 2022 23:10 )   PT: 11.5 sec;   INR: 0.97          PTT - ( 27 May 2022 23:10 )  PTT:28.5 sec    I&O's Summary    27 May 2022 07:01  -  28 May 2022 07:00  --------------------------------------------------------  IN: 5835 mL / OUT: 4425 mL / NET: 1410 mL    28 May 2022 07:01  -  28 May 2022 11:58  --------------------------------------------------------  IN: 375 mL / OUT: 1925 mL / NET: -1550 mL      BNPSerum Pro-Brain Natriuretic Peptide: 763 pg/mL (05-27 @ 20:54)    RADIOLOGY & ADDITIONAL STUDIES:    TELEMETRY:    EKG:         INTERVAL EVENTS:  Received 20 mg IV lasix with adequate urine output overnight. Overnight, had headache, scotoma. The shortness of breath has improved however. Overnight she was started on IV mag infusion and subsequently feels a little drowsy.    PAST MEDICAL & SURGICAL HISTORY:  Gestational diabetes    No significant past surgical history        MEDICATIONS  (STANDING):  lactated ringers. 1000 milliLiter(s) (75 mL/Hr) IV Continuous <Continuous>  magnesium sulfate Infusion 2 Gm/Hr (50 mL/Hr) IV Continuous <Continuous>    MEDICATIONS  (PRN):  acetaminophen     Tablet .. 650 milliGRAM(s) Oral every 6 hours PRN Moderate Pain (4 - 6)  acetaminophen 300 mG/butalbital 50 mG/ caffeine 40 mG 1 Capsule(s) Oral every 6 hours PRN Headache  ibuprofen  Tablet. 400 milliGRAM(s) Oral every 6 hours PRN Moderate Pain (4 - 6)      Vital Signs Last 24 Hrs  T(C): 36.6 (28 May 2022 10:00), Max: 36.8 (27 May 2022 18:00)  T(F): 97.9 (28 May 2022 10:00), Max: 98.3 (28 May 2022 08:00)  HR: 88 (28 May 2022 10:00) (56 - 88)  BP: 145/89 (28 May 2022 10:00) (125/79 - 164/80)  BP(mean): 108 (28 May 2022 10:00) (108 - 108)  RR: 19 (28 May 2022 10:00) (18 - 19)  SpO2: 99% (28 May 2022 10:00) (96% - 99%)     PHYSICAL EXAM:  GEN: Awake, alert. NAD.   HEENT: NCAT, PERRL, EOMI. Mucosa moist. No JVD.  RESP: CTA b/l  CV: RRR. Normal S1/S2. No m/r/g.  ABD: Soft. NT/ND. BS+  EXT: Warm. No edema, clubbing, or cyanosis.   NEURO: AAOx3. No focal deficits.     LABS:                        10.1   6.42  )-----------( 247      ( 28 May 2022 08:35 )             30.6     05-28    140  |  104  |  6<L>  ----------------------------<  85  3.4<L>   |  23  |  0.48<L>    Ca    8.2<L>      28 May 2022 08:35  Phos  3.3     05-27  Mg     4.7     05-28    TPro  6.5  /  Alb  3.6  /  TBili  0.2  /  DBili  x   /  AST  21  /  ALT  17  /  AlkPhos  120  05-28    CARDIAC MARKERS ( 26 May 2022 14:55 )  x     / 0.01 ng/mL / x     / x     / x          PT/INR - ( 27 May 2022 23:10 )   PT: 11.5 sec;   INR: 0.97          PTT - ( 27 May 2022 23:10 )  PTT:28.5 sec    I&O's Summary    27 May 2022 07:01  -  28 May 2022 07:00  --------------------------------------------------------  IN: 5835 mL / OUT: 4425 mL / NET: 1410 mL    28 May 2022 07:01  -  28 May 2022 11:58  --------------------------------------------------------  IN: 375 mL / OUT: 1925 mL / NET: -1550 mL      BNPSerum Pro-Brain Natriuretic Peptide: 763 pg/mL (05-27 @ 20:54)

## 2022-05-28 NOTE — CHART NOTE - NSCHARTNOTEFT_GEN_A_CORE
NOTE ENTRY DELAYED 2/2 PT CARE    Patient evaluated at bedside for clinical magnesium check. Serum magnesium drawn at 1300.  Pt state she feels warm and "out of it" 2/2 IV magnesium. She denies visual disturbances/scotoma, headache, and RUQ pain. Also denies chest pain, palpitations, SOB, inc WOB, abd/epigastric pain,  nausea/vomiting. Pain well controlled.     T(C): 36.5 (05-28-22 @ 14:00), Max: 36.8 (05-28-22 @ 08:00)  HR: 80 (05-28-22 @ 14:00) (80 - 88)  BP: 143/90 (05-28-22 @ 14:00) (130/81 - 145/89)  RR: 18 (05-28-22 @ 14:00) (18 - 19)  SpO2: 97% (05-28-22 @ 14:00) (96% - 99%)      05-27 @ 07:01  -  05-28 @ 07:00  --------------------------------------------------------  IN: 5835 mL / OUT: 4425 mL / NET: 1410 mL    05-28 @ 07:01  -  05-28 @ 15:56  --------------------------------------------------------  IN: 1000 mL / OUT: 3325 mL / NET: -2325 mL      Gen: NAD, AAOx3  CV: RRR, no M/R/G  Pulm: CTAB, no R/R/W  Abd: soft, nontender, no rebound or guarding, no epigastric tenderness, liver nonpalpable  : voiding  Ext: +1 edema lexis, no ankle clonus, 1+ upper extremity reflexes b/l                          10.1   6.42  )-----------( 247      ( 28 May 2022 08:35 )             30.6     05-28    140  |  104  |  6<L>  ----------------------------<  85  3.4<L>   |  23  |  0.48<L>    Ca    8.2<L>      28 May 2022 08:35  Phos  3.3     05-27  Mg     4.9     05-28    TPro  6.5  /  Alb  3.6  /  TBili  0.2  /  DBili  x   /  AST  21  /  ALT  17  /  AlkPhos  120  05-28    acetaminophen     Tablet .. 650 milliGRAM(s) Oral every 6 hours PRN  acetaminophen 300 mG/butalbital 50 mG/ caffeine 40 mG 1 Capsule(s) Oral every 6 hours PRN  ibuprofen  Tablet. 400 milliGRAM(s) Oral every 6 hours PRN  labetalol 100 milliGRAM(s) Oral every 12 hours  lactated ringers. 1000 milliLiter(s) IV Continuous <Continuous>  magnesium sulfate Infusion 2 Gm/Hr IV Continuous <Continuous>      05-27-22 @ 07:01 - 05-28-22 @ 07:00  --------------------------------------------------------  IN: 5835 mL / OUT: 4425 mL / NET: 1410 mL    05-28-22 @ 07:01 - 05-28-22 @ 15:56  --------------------------------------------------------  IN: 1000 mL / OUT: 3325 mL / NET: -2325 mL          A&P:  37y  s/p pCS for arrest of descent now readmitted for preeclampsia with severe features (BP, chest pain, SOB, HA)  No complaints currently. No severe range BP.  Antihypertensives: will start labetalol 100mg PO BID per nephrology recs  Mg level is 4.9    - Continue IV Magnesium @2G/hr until 5/29 [id number]  - Continue to monitor blood pressures  - Follow up on Magnesium serum levels. Next Magnesium check @ 1900  - GI: reg diet  - : strict Is and Os

## 2022-05-28 NOTE — CONSULT NOTE ADULT - SUBJECTIVE AND OBJECTIVE BOX
38 yo  on POD5 s/p primary c/s on  for arrest of descent.  Pt comes to the ED today complaining of chest pain on inspiration and movement since last night.  Pt states she also started to notice a dry cough that gets worse upon inspiration.  Pt also notes a dull headache that started since she came to the ED. Pt states she has never had elevated BP in the past and these symptoms all came on suddenly overnight.  Pt does not admit to having scotoma, blurry vision, dizziness, RUQ pain, elevated pressures in the past, LE edema.    Pt denies fever, chills, chest pain, SOB, abdominal pain, nausea, vomiting, vaginal bleeding      OB/GYN Hx: G1: 24 Primary c/s for arrest of descent. c/b GDMA1 not needing medication, never had elevated BP.   Last PAP smear: Pt had 1 abnormal papsmear a long time ago with colposcopy, since resolved.  Pt denies all other gyn symptoms    PMHx: PVCs in the past had it worked up it was negative  SHx: denies  Meds: tylenol/ibuprofen ATC for pain   Allergies: Shrimp, PCN,     Social hx:  w/ 1 baby at home. Mom at bedside. Stressed recently,    PHYSICAL EXAM:   Vital Signs Last 24 Hrs  T(C): 37.3 (26 May 2022 14:27), Max: 37.3 (26 May 2022 14:27)  T(F): 99.2 (26 May 2022 14:27), Max: 99.2 (26 May 2022 14:27)  HR: 70 (26 May 2022 17:02) (70 - 84)  BP: 133/74 (26 May 2022 17:02) (133/74 - 153/86)  RR: 16 (26 May 2022 17:02) (16 - 18)  SpO2: 98% (26 May 2022 17:02) (98% - 99%)    **************************  Constitutional: Alert & Oriented x3, No acute distress  Respiratory: Clear to ausculation bilaterally; no wheezing, rhonchi, or crackles  Cardiovascular: regular rate and rhythm, no murmurs, or gallops  Gastrointestinal: soft, non tender, positive bowel sounds, no rebound or guarding   Pelvic exam:   Extremities: no calf tenderness or swelling    LABS:                        8.9    5.99  )-----------( 205      ( 26 May 2022 14:55 )             27.5         144  |  111<H>  |  10  ----------------------------<  84  3.9   |  21<L>  |  0.58    Ca    8.7      26 May 2022 14:55    TPro  5.9<L>  /  Alb  3.3  /  TBili  0.2  /  DBili  x   /  AST  17  /  ALT  14  /  AlkPhos  131<H>      PT/INR - ( 26 May 2022 14:55 )   PT: 11.7 sec;   INR: 0.98          PTT - ( 26 May 2022 14:55 )  PTT:28.3 sec        RADIOLOGY & ADDITIONAL STUDIES:  ACC: [id number] [facility]                          PROCEDURE DATE:  2022          INTERPRETATION:  CTA (CT angiography) of the CHEST dated 2022 4:35 PM    INDICATION: post partum c section with chest tight/sob r/o pe    TECHNIQUE: CT angiography of the chest was performed during bolus   injection of intravenous contrast.  Post-processing including the   production of axial and coronal and sagittal multiplanar reformatted   images and coronal maximum intensity projections (MIPs) was performed.    CONTRAST USAGE:  CONTRAST/COMPLICATIONS:  IV Contrast: Isovue 370  78 cc administered  Oral Contrast: NONE  Complications: None reported at time of study completion    PRIOR STUDY: None.    FINDINGS: No visualized PE. The main pulmonary artery is somewhat   prominent measuring 3.4 cm. The heart is normal in size.  No pericardial   effusion is seen.  The great vessels are unremarkable.  No mediastinal or   hilar lymphadenopathy is seen.    Evaluation of the pulmonary parenchyma demonstrates subsegmental   atelectasis right lower lobe..  Trace pleural fluid at the bilateral   posterior costophrenic sulci..    Limited evaluation of the upper abdomen demonstrates no abnormality.    Evaluation of the osseous structures demonstrates no significant   abnormality..      IMPRESSION: No visualized PE.          --- End of Report ---            ELENITA DHALIWAL MD; Attending Radiologist  This document has been electronically signed. May 26 2022  4:44PM    ACC: [id number] EXAM:  [facility]                          PROCEDURE DATE:  2022          INTERPRETATION:  CTA (CT angiography) of the CHEST dated 2022 4:35 PM    INDICATION: post partum c section with chest tight/sob r/o pe    TECHNIQUE: CT angiography of the chest was performed during bolus   injection of intravenous contrast.  Post-processing including the   production of axial and coronal and sagittal multiplanar reformatted   images and coronal maximum intensity projections (MIPs) was performed.    CONTRAST USAGE:  CONTRAST/COMPLICATIONS:  IV Contrast: Isovue 370  78 cc administered  Oral Contrast: NONE  Complications: None reported at time of study completion    PRIOR STUDY: None.    FINDINGS: No visualized PE. The main pulmonary artery is somewhat   prominent measuring 3.4 cm. The heart is normal in size.  No pericardial   effusion is seen.  The great vessels are unremarkable.  No mediastinal or   hilar lymphadenopathy is seen.    Evaluation of the pulmonary parenchyma demonstrates subsegmental   atelectasis right lower lobe..  Trace pleural fluid at the bilateral   posterior costophrenic sulci..    Limited evaluation of the upper abdomen demonstrates no abnormality.    Evaluation of the osseous structures demonstrates no significant   abnormality..      IMPRESSION: No visualized PE.          --- End of Report ---            ELENITA DHALIWAL MD; Attending Radiologist  This document has been electronically signed. May 26 2022  4:44PM  ACC: [id number] EXAM:  CT ANGIO CHEST PULM [facility]                          PROCEDURE DATE:  2022          INTERPRETATION:  CTA (CT angiography) of the CHEST dated 2022 4:35 PM    INDICATION: post partum c section with chest tight/sob r/o pe    TECHNIQUE: CT angiography of the chest was performed during bolus   injection of intravenous contrast.  Post-processing including the   production of axial and coronal and sagittal multiplanar reformatted   images and coronal maximum intensity projections (MIPs) was performed.    CONTRAST USAGE:  CONTRAST/COMPLICATIONS:  IV Contrast: Isovue 370  78 cc administered  Oral Contrast: NONE  Complications: None reported at time of study completion    PRIOR STUDY: None.    FINDINGS: No visualized PE. The main pulmonary artery is somewhat   prominent measuring 3.4 cm. The heart is normal in size.  No pericardial   effusion is seen.  The great vessels are unremarkable.  No mediastinal or   hilar lymphadenopathy is seen.    Evaluation of the pulmonary parenchyma demonstrates subsegmental   atelectasis right lower lobe..  Trace pleural fluid at the bilateral   posterior costophrenic sulci..    Limited evaluation of the upper abdomen demonstrates no abnormality.    Evaluation of the osseous structures demonstrates no significant   abnormality..      IMPRESSION: No visualized PE.          --- End of Report ---            ELENITA DHALIWAL MD; Attending Radiologist  This document has been electronically signed. May 26 2022  4:44PM  ACC: [id number] EXAM:  CT ANGIO CHEST PULM [facility]                          PROCEDURE DATE:  2022          INTERPRETATION:  CTA (CT angiography) of the CHEST dated 2022 4:35 PM    INDICATION: post partum c section with chest tight/sob r/o pe    TECHNIQUE: CT angiography of the chest was performed during bolus   injection of intravenous contrast.  Post-processing including the   production of axial and coronal and sagittal multiplanar reformatted   images and coronal maximum intensity projections (MIPs) was performed.    CONTRAST USAGE:  CONTRAST/COMPLICATIONS:  IV Contrast: Isovue 370  78 cc administered  Oral Contrast: NONE  Complications: None reported at time of study completion    PRIOR STUDY: None.    FINDINGS: No visualized PE. The main pulmonary artery is somewhat   prominent measuring 3.4 cm. The heart is normal in size.  No pericardial   effusion is seen.  The great vessels are unremarkable.  No mediastinal or   hilar lymphadenopathy is seen.    Evaluation of the pulmonary parenchyma demonstrates subsegmental   atelectasis right lower lobe..  Trace pleural fluid at the bilateral   posterior costophrenic sulci..    Limited evaluation of the upper abdomen demonstrates no abnormality.    Evaluation of the osseous structures demonstrates no significant   abnormality..      IMPRESSION: No visualized PE.          --- End of Report ---            ELENITA DHALIWAL MD; Attending Radiologist  This document has been electronically signed. May 26 2022  4:44PM
HPI:  38 yo  on POD5 s/p primary c/s on  for arrest of descent. She presents now with dyspnea with minimal activities that started yesterday associated with chest tightness. She repors swelling of her arms and legs with pregnancy that she reports slightly worsened since her . She denies any dyspnea at rest, although she feels some chest tightness when she lays back that improves when sitting forward. also notes some mild dry cough, no fever/chills, no n/v/d. Here her BP was noted to be elevated. was previously 100s/50s and now 130s-150s systolic. her proBNP was elevated to 600, she underwent a CT-PE scan which showed small pleural effusions, no pulm edema or infiltrates and negative for PE. Cardiology was consulted to assist in diagnosis and management of dyspnea.      ROS: A 10-point review of systems was otherwise negative.    PAST MEDICAL & SURGICAL HISTORY:  Gestational diabetes    No significant past surgical history    SOCIAL HISTORY:  FAMILY HISTORY:    ALLERGIES: 	  penicillins (Pruritus)      MEDICATIONS:  acetaminophen     Tablet .. 650 milliGRAM(s) Oral every 6 hours PRN  ibuprofen  Tablet. 400 milliGRAM(s) Oral every 6 hours PRN      PHYSICAL EXAM:  T(C): 37.3 (22 @ 06:03), Max: 37.3 (22 @ 14:27)  HR: 83 (22 @ 06:03) (56 - 84)  BP: 151/82 (22 @ 06:03) (127/66 - 151/82)  RR: 18 (22 @ 06:03) (16 - 18)  SpO2: 97% (22 @ 06:03) (87% - 100%)  Wt(kg): --    GEN: Awake, comfortable. NAD.   HEENT: NCAT, PERRL, EOMI. Mucosa moist. No JVD.   RESP: CTA b/l  CV: RRR, normal s1/s2. No m/r/g.  ABD: Soft, NTND. BS+  EXT: Warm. No pitting edema, clubbing, or cyanosis.   NEURO: AAOx3. No focal deficits.    I&O's Summary    Height (cm): 157.5 ( @ 14:27)  LABS:	 	                        8.9    5.99  )-----------( 205      ( 26 May 2022 14:55 )             27.5         144  |  111<H>  |  10  ----------------------------<  84  3.9   |  21<L>  |  0.58    Ca    8.7      26 May 2022 14:55    TPro  5.9<L>  /  Alb  3.3  /  TBili  0.2  /  DBili  x   /  AST  17  /  ALT  14  /  AlkPhos  131<H>      CARDIAC MARKERS ( 26 May 2022 14:55 )  x     / 0.01 ng/mL / x     / x     / x          proBNP: Serum Pro-Brain Natriuretic Peptide: 651 pg/mL ( @ 14:55)      ECG - NSR, right axis deviation, normal intervals    Echo - normal biventricular function, normal diastolic function, mild MR, mild TR, PASP 33 (no evidence of pulmonary hypertension), IVC is normal and collapsable thus CVP ~3mmHg    < from: CT Angio Chest PE Protocol w/ IV Cont (22 @ 16:35) >  No visualizedPE. The main pulmonary artery is somewhat   prominent measuring 3.4 cm. The heart is normal in size.  No pericardial   effusion is seen.  The great vessels are unremarkable.  No mediastinal or   hilar lymphadenopathy is seen.    Evaluation of the pulmonary parenchyma demonstrates subsegmental   atelectasis right lower lobe..  Trace pleural fluid at the bilateral   posterior costophrenic sulci..    < end of copied text >    
    HPI:  37F with pmhx of c/s on 5/22 who presented to the hospital on May 26 in the setting of chest pain on inspiration and movement. States she also had some leg edema. Pt states she has never been told in the past she has high blood pressure and did not have any issues during her pregnancy; of note she states on some visits noted BP was in 130's but never into the 140's. On arrival to the hospital underwent CTA to rule out PE which was negative. She is now receiving IV magnesium to help with her PEC. She also received lasix which helped resolve her leg edema. Her blood pressure on evaluation this AM remains in the high 130's to low 140s. Nephrology consulted for HTN management      PAST MEDICAL & SURGICAL HISTORY:  Gestational diabetes      No significant past surgical history      Allergies:  penicillins (Pruritus)      Home Medications:   acetaminophen     Tablet .. 650 milliGRAM(s) Oral every 6 hours PRN  acetaminophen 300 mG/butalbital 50 mG/ caffeine 40 mG 1 Capsule(s) Oral every 6 hours PRN  ibuprofen  Tablet. 400 milliGRAM(s) Oral every 6 hours PRN  lactated ringers. 1000 milliLiter(s) IV Continuous <Continuous>  magnesium sulfate Infusion 2 Gm/Hr IV Continuous <Continuous>      Hospital Medications:   MEDICATIONS  (STANDING):  lactated ringers. 1000 milliLiter(s) (75 mL/Hr) IV Continuous <Continuous>  magnesium sulfate Infusion 2 Gm/Hr (50 mL/Hr) IV Continuous <Continuous>      SOCIAL HISTORY:  Denies ETOh, Smoking,     Family History:  FAMILY HISTORY:        VITALS:  T(F): 97.9 (05-28-22 @ 12:00), Max: 98.3 (05-28-22 @ 08:00)  HR: 88 (05-28-22 @ 12:00)  BP: 137/86 (05-28-22 @ 12:00)  RR: 18 (05-28-22 @ 12:00)  SpO2: 96% (05-28-22 @ 12:00)  Wt(kg): --    05-27 @ 07:01  -  05-28 @ 07:00  --------------------------------------------------------  IN: 5835 mL / OUT: 4425 mL / NET: 1410 mL    05-28 @ 07:01  -  05-28 @ 13:03  --------------------------------------------------------  IN: 375 mL / OUT: 2325 mL / NET: -1950 mL        CAPILLARY BLOOD GLUCOSE      Review of Systems:  ROS negative except as per HPI    PHYSICAL EXAM:  GENERAL: Alert, awake, oriented x3   HEENT: MIGUEL, EOMI, neck supple, no JVP  CHEST/LUNG: Bilateral clear breath sounds  HEART: Regular rate and rhythm, no murmur, no gallops, no rub   ABDOMEN: Soft, nontender, non distended  EXTREMITIES: no pedal edema  Neurology: AAOx3, no focal neurological deficit  SKIN: No rash or skin lesion     LABS:  05-28    140  |  104  |  6<L>  ----------------------------<  85  3.4<L>   |  23  |  0.48<L>    Ca    8.2<L>      28 May 2022 08:35  Phos  3.3     05-27  Mg     4.7     05-28    TPro  6.5  /  Alb  3.6  /  TBili  0.2  /  DBili      /  AST  21  /  ALT  17  /  AlkPhos  120  05-28    Creatinine Trend: 0.48 <--, 0.59 <--, 0.58 <--, 0.58 <--                        10.1   6.42  )-----------( 247      ( 28 May 2022 08:35 )             30.6     Urine Studies:

## 2022-05-28 NOTE — CONSULT NOTE ADULT - ATTENDING COMMENTS
Briefly, 36 yo  s/p c section for OB indications 5 days ago with history of PVCs presents with dyspnea with minimal exertion and chest tightness x 1 day.  Tightness is midsternal, worse when lying down, 4/10, non radiating.  No cough, wheezing, fever/chills, palpitations, dizziness. + breast feeding.   pain controlled with tylenol/motrin.     Today remains dyspneic when walking to bathroom, chest pain improving.      O/E: Vitals reviewed, BP elevated.   Gen: NAD, CVS: S1S2, RRR, Lungs: CTA, Extrem: mild edema    Labs: reviewed. BNP> 600, trop neg, neg PEC.    Echo images reviewed- normal LV/RV function, no significant valve disease.   EKG NS with RAD.    CT neg for PE.  + mild pleural effusion, no edema, no PE.      A/P  36 yo  POD s/p c section and h/o PVCs p/w chest tightness and WHITE. Found to have elevated blood pressures without signs of PEC.  Lab + BNP, normal LVEF, CT + pleural effusions    - lasix 20 mg po x 1 dose  - monitor BP, if continue to rise consider labetalol  - repeat BNP tomorrow
I:  SBP up to 140s.  A: Postpartum PEC. No evidence for PE.   P: Start labetalol. Aim SBP < 140/90.

## 2022-05-28 NOTE — CONSULT NOTE ADULT - ASSESSMENT
37F with pmhx of c/s on 5/22 who presented to the hospital on May 26 in the setting of chest pain on inspiration and movement, HTN and swelling of her legs c/f PEC w/SF. Nephrology consulted for PEC management    Assessment/Plan:     #PEC w/SF  Symptomatic as pt with chest tightness that has now started to resolve and leg edema that has also started to resolve. She is currrently receiving IV magnesium but BP remains in high 130's low 140's.   -Recommend starting labetalol 100mg Q12h  -Would not start a second BP agent at this time  -Let pt complete Mg gtt; LR should DC a few hours later  -If BP remains persistently above 140/90 despite having received BP meds, can give additional dose of 100mg labetalol   -avoid NSAIDs, pain control w/Tylenol or other analgesics   -daily BMP   -strict I/Os   -renal diet     Thank you for the opportunity to participate in the care of your patient. The nephrology service remains available to assist with any questions or concerns. Please feel free to reach us by paging the on-call nephrology fellow for urgent issues or as below.     Guille Brunson D.O.  PGY 4 - Nephrology Fellow  [phone] 37F with pmhx of c/s on 5/22 who presented to the hospital on May 26 in the setting of chest pain on inspiration and movement, HTN and swelling of her legs c/f PEC w/SF. Nephrology consulted for PEC management    Assessment/Plan:     #PEC w/SF  Symptomatic as pt with chest tightness that has now started to resolve and leg edema that has also started to resolve. She is currrently receiving IV magnesium but BP remains in high 130's low 140's.   -Recommend starting labetalol 100mg Q12h  -Would not start a second BP agent at this time  -Let pt complete Mg gtt; LR should DC a few hours later  -If BP remains persistently above 140/90 despite having received BP meds, can give additional dose of 100mg labetalol   -Hold parameters for SBP < 110 or DBP < 70  -avoid NSAIDs, pain control w/Tylenol or other analgesics   -daily BMP   -strict I/Os   -renal diet   -On discharge should have tele health visit made with Nephrology [phone]. Advised to keep BP log and should have a blood pressure cuff ordered if patient does not already have ne    Thank you for the opportunity to participate in the care of your patient. The nephrology service remains available to assist with any questions or concerns. Please feel free to reach us by paging the on-call nephrology fellow for urgent issues or as below.     Guille Brunson D.O.  PGY 4 - Nephrology Fellow  [phone]

## 2022-05-29 ENCOUNTER — TRANSCRIPTION ENCOUNTER (OUTPATIENT)
Age: 37
End: 2022-05-29

## 2022-05-29 VITALS
TEMPERATURE: 99 F | SYSTOLIC BLOOD PRESSURE: 134 MMHG | RESPIRATION RATE: 18 BRPM | OXYGEN SATURATION: 95 % | DIASTOLIC BLOOD PRESSURE: 82 MMHG | HEART RATE: 75 BPM

## 2022-05-29 LAB
ALBUMIN SERPL ELPH-MCNC: 3.3 G/DL — SIGNIFICANT CHANGE UP (ref 3.3–5)
ALP SERPL-CCNC: 114 U/L — SIGNIFICANT CHANGE UP (ref 40–120)
ALT FLD-CCNC: 16 U/L — SIGNIFICANT CHANGE UP (ref 10–45)
ANION GAP SERPL CALC-SCNC: 11 MMOL/L — SIGNIFICANT CHANGE UP (ref 5–17)
AST SERPL-CCNC: 15 U/L — SIGNIFICANT CHANGE UP (ref 10–40)
BASOPHILS # BLD AUTO: 0.03 K/UL — SIGNIFICANT CHANGE UP (ref 0–0.2)
BASOPHILS NFR BLD AUTO: 0.5 % — SIGNIFICANT CHANGE UP (ref 0–2)
BILIRUB SERPL-MCNC: 0.2 MG/DL — SIGNIFICANT CHANGE UP (ref 0.2–1.2)
BUN SERPL-MCNC: 9 MG/DL — SIGNIFICANT CHANGE UP (ref 7–23)
CALCIUM SERPL-MCNC: 7.4 MG/DL — LOW (ref 8.4–10.5)
CHLORIDE SERPL-SCNC: 106 MMOL/L — SIGNIFICANT CHANGE UP (ref 96–108)
CO2 SERPL-SCNC: 22 MMOL/L — SIGNIFICANT CHANGE UP (ref 22–31)
CREAT SERPL-MCNC: 0.65 MG/DL — SIGNIFICANT CHANGE UP (ref 0.5–1.3)
EGFR: 118 ML/MIN/1.73M2 — SIGNIFICANT CHANGE UP
EOSINOPHIL # BLD AUTO: 0.17 K/UL — SIGNIFICANT CHANGE UP (ref 0–0.5)
EOSINOPHIL NFR BLD AUTO: 2.8 % — SIGNIFICANT CHANGE UP (ref 0–6)
GLUCOSE SERPL-MCNC: 90 MG/DL — SIGNIFICANT CHANGE UP (ref 70–99)
HCT VFR BLD CALC: 32 % — LOW (ref 34.5–45)
HGB BLD-MCNC: 10.2 G/DL — LOW (ref 11.5–15.5)
IMM GRANULOCYTES NFR BLD AUTO: 2.1 % — HIGH (ref 0–1.5)
LYMPHOCYTES # BLD AUTO: 1.07 K/UL — SIGNIFICANT CHANGE UP (ref 1–3.3)
LYMPHOCYTES # BLD AUTO: 17.3 % — SIGNIFICANT CHANGE UP (ref 13–44)
MCHC RBC-ENTMCNC: 29.2 PG — SIGNIFICANT CHANGE UP (ref 27–34)
MCHC RBC-ENTMCNC: 31.9 GM/DL — LOW (ref 32–36)
MCV RBC AUTO: 91.7 FL — SIGNIFICANT CHANGE UP (ref 80–100)
MONOCYTES # BLD AUTO: 0.39 K/UL — SIGNIFICANT CHANGE UP (ref 0–0.9)
MONOCYTES NFR BLD AUTO: 6.3 % — SIGNIFICANT CHANGE UP (ref 2–14)
NEUTROPHILS # BLD AUTO: 4.38 K/UL — SIGNIFICANT CHANGE UP (ref 1.8–7.4)
NEUTROPHILS NFR BLD AUTO: 71 % — SIGNIFICANT CHANGE UP (ref 43–77)
NRBC # BLD: 0 /100 WBCS — SIGNIFICANT CHANGE UP (ref 0–0)
PLATELET # BLD AUTO: 281 K/UL — SIGNIFICANT CHANGE UP (ref 150–400)
POTASSIUM SERPL-MCNC: 3.8 MMOL/L — SIGNIFICANT CHANGE UP (ref 3.5–5.3)
POTASSIUM SERPL-SCNC: 3.8 MMOL/L — SIGNIFICANT CHANGE UP (ref 3.5–5.3)
PROT SERPL-MCNC: 6 G/DL — SIGNIFICANT CHANGE UP (ref 6–8.3)
RBC # BLD: 3.49 M/UL — LOW (ref 3.8–5.2)
RBC # FLD: 15.8 % — HIGH (ref 10.3–14.5)
SODIUM SERPL-SCNC: 139 MMOL/L — SIGNIFICANT CHANGE UP (ref 135–145)
WBC # BLD: 6.17 K/UL — SIGNIFICANT CHANGE UP (ref 3.8–10.5)
WBC # FLD AUTO: 6.17 K/UL — SIGNIFICANT CHANGE UP (ref 3.8–10.5)

## 2022-05-29 PROCEDURE — 83615 LACTATE (LD) (LDH) ENZYME: CPT

## 2022-05-29 PROCEDURE — 93005 ELECTROCARDIOGRAM TRACING: CPT

## 2022-05-29 PROCEDURE — 80048 BASIC METABOLIC PNL TOTAL CA: CPT

## 2022-05-29 PROCEDURE — 84550 ASSAY OF BLOOD/URIC ACID: CPT

## 2022-05-29 PROCEDURE — 84443 ASSAY THYROID STIM HORMONE: CPT

## 2022-05-29 PROCEDURE — 99285 EMERGENCY DEPT VISIT HI MDM: CPT | Mod: 25

## 2022-05-29 PROCEDURE — 36415 COLL VENOUS BLD VENIPUNCTURE: CPT

## 2022-05-29 PROCEDURE — 80053 COMPREHEN METABOLIC PANEL: CPT

## 2022-05-29 PROCEDURE — 85027 COMPLETE CBC AUTOMATED: CPT

## 2022-05-29 PROCEDURE — 85384 FIBRINOGEN ACTIVITY: CPT

## 2022-05-29 PROCEDURE — 83735 ASSAY OF MAGNESIUM: CPT

## 2022-05-29 PROCEDURE — 93306 TTE W/DOPPLER COMPLETE: CPT

## 2022-05-29 PROCEDURE — 85610 PROTHROMBIN TIME: CPT

## 2022-05-29 PROCEDURE — 85379 FIBRIN DEGRADATION QUANT: CPT

## 2022-05-29 PROCEDURE — 85730 THROMBOPLASTIN TIME PARTIAL: CPT

## 2022-05-29 PROCEDURE — 85025 COMPLETE CBC W/AUTO DIFF WBC: CPT

## 2022-05-29 PROCEDURE — 71045 X-RAY EXAM CHEST 1 VIEW: CPT

## 2022-05-29 PROCEDURE — 84100 ASSAY OF PHOSPHORUS: CPT

## 2022-05-29 PROCEDURE — 87635 SARS-COV-2 COVID-19 AMP PRB: CPT

## 2022-05-29 PROCEDURE — 99233 SBSQ HOSP IP/OBS HIGH 50: CPT

## 2022-05-29 PROCEDURE — 71275 CT ANGIOGRAPHY CHEST: CPT | Mod: MA

## 2022-05-29 PROCEDURE — 84484 ASSAY OF TROPONIN QUANT: CPT

## 2022-05-29 PROCEDURE — 83880 ASSAY OF NATRIURETIC PEPTIDE: CPT

## 2022-05-29 RX ORDER — ACETAMINOPHEN 500 MG
2 TABLET ORAL
Qty: 0 | Refills: 0 | DISCHARGE
Start: 2022-05-29

## 2022-05-29 RX ORDER — LABETALOL HCL 100 MG
1 TABLET ORAL
Qty: 60 | Refills: 2
Start: 2022-05-29 | End: 2022-08-26

## 2022-05-29 RX ORDER — IBUPROFEN 200 MG
1 TABLET ORAL
Qty: 0 | Refills: 0 | DISCHARGE
Start: 2022-05-29

## 2022-05-29 RX ADMIN — Medication 650 MILLIGRAM(S): at 05:13

## 2022-05-29 RX ADMIN — Medication 100 MILLIGRAM(S): at 05:42

## 2022-05-29 RX ADMIN — Medication 650 MILLIGRAM(S): at 04:33

## 2022-05-29 NOTE — DISCHARGE NOTE OB - MEDICATION SUMMARY - MEDICATIONS TO TAKE
I will START or STAY ON the medications listed below when I get home from the hospital:    blood pressure cuff1  -- 1 each intradermal 2 times a day   -- Indication: For Preeclampsia    acetaminophen 325 mg oral tablet  -- 2 tab(s) by mouth every 6 hours, As needed, Moderate Pain (4 - 6)  -- Indication: For Pain    ibuprofen 400 mg oral tablet  -- 1 tab(s) by mouth every 6 hours, As needed, Moderate Pain (4 - 6)  -- Indication: For Pain    labetalol 100 mg oral tablet  -- 1 tab(s) by mouth every 12 hours  -- Indication: For Preeclampsia

## 2022-05-29 NOTE — PROGRESS NOTE ADULT - SUBJECTIVE AND OBJECTIVE BOX
Patient is a 37y Female seen and evaluated at bedside seen this morning doing well. BP's have been stable below 140/90. Per team to hopefully go home today on Labetalol 100mg Q12h.       Meds:    acetaminophen     Tablet .. 650 every 6 hours PRN  acetaminophen 300 mG/butalbital 50 mG/ caffeine 40 mG 1 every 6 hours PRN  ibuprofen  Tablet. 400 every 6 hours PRN  labetalol 100 every 12 hours  lactated ringers. 1000 <Continuous>      T(C): , Max: 37 (05-29-22 @ 10:00)  T(F): , Max: 98.6 (05-29-22 @ 10:00)  HR: 75 (05-29-22 @ 10:00)  BP: 134/82 (05-29-22 @ 10:00)  BP(mean): --  RR: 18 (05-29-22 @ 10:00)  SpO2: 95% (05-29-22 @ 10:00)  Wt(kg): --    05-28 @ 07:01  -  05-29 @ 07:00  --------------------------------------------------------  IN: 3792.5 mL / OUT: 7775 mL / NET: -3982.5 mL      Review of Systems:  ROS negative except as per HPI    PHYSICAL EXAM:  GENERAL: Alert, awake, oriented x3   HEENT: MIGUEL, EOMI, neck supple, no JVP  CHEST/LUNG: Bilateral clear breath sounds  HEART: Regular rate and rhythm, no murmur, no gallops, no rub   ABDOMEN: Soft, nontender, non distended  EXTREMITIES: no pedal edema  Neurology: AAOx3, no focal neurological deficit  SKIN: No rash or skin lesion         LABS:                        10.2   6.17  )-----------( 281      ( 29 May 2022 07:02 )             32.0     05-29    139  |  106  |  9   ----------------------------<  90  3.8   |  22  |  0.65    Ca    7.4<L>      29 May 2022 07:02  Mg     5.3     05-28    TPro  6.0  /  Alb  3.3  /  TBili  0.2  /  DBili  x   /  AST  15  /  ALT  16  /  AlkPhos  114  05-29      PT/INR - ( 27 May 2022 23:10 )   PT: 11.5 sec;   INR: 0.97          PTT - ( 27 May 2022 23:10 )  PTT:28.5 sec          RADIOLOGY & ADDITIONAL STUDIES:

## 2022-05-29 NOTE — PROGRESS NOTE ADULT - REASON FOR ADMISSION
observation of elevated bp in a post op c/s pt

## 2022-05-29 NOTE — DISCHARGE NOTE OB - PATIENT PORTAL LINK FT
You can access the FollowMyHealth Patient Portal offered by [facility] by registering at the following website: [email or web address]. By joining [employer]’s FollowMyHealth portal, you will also be able to view your health information using other applications (apps) compatible with our system.

## 2022-05-29 NOTE — DISCHARGE NOTE OB - CARE PROVIDERS DIRECT ADDRESSES
,DirectAddress_Unknown,DirectAddress_Unknown,[email or web address]

## 2022-05-29 NOTE — DISCHARGE NOTE OB - PLAN OF CARE
Follow up with your OB in one week for a blood pressure check.  Purchase electronic blood pressure cuff at your local pharmacy. Check blood pressure 3x a day. If bp >160/110, you develop a headache not relieved by tylenol, visual disturbances, or right upper abdominal pain, call your doctor or the hospital, or go to your nearest emergency room. Regular diet, normal activity, nothing in the vagina for 6 weeks--no sex, tampons, tub baths, or swimming pools.

## 2022-05-29 NOTE — DISCHARGE NOTE OB - ADDITIONAL INSTRUCTIONS
Please make a follow up appointment with your cardiologist Dr. Garcia in 1-2 weeks.  Please make a follow up tele-health appointment with your nephrologist Dr. Phipps in 1-2 weeks.  Please make a follow up appointment with your OBGYN next week.

## 2022-05-29 NOTE — DISCHARGE NOTE OB - NS MD DC FALL RISK RISK
For information on Fall & Injury Prevention, visit: [email or web address] OR  [email or web address] OR  https://www.cdc.gov/steadi/patient.html

## 2022-05-29 NOTE — PROGRESS NOTE ADULT - ASSESSMENT
A/P: 37y s/p  section c/b PEC w/ SF (BP, HA, [place], SOB), POD#6 HD3, stable  -  PEC w/ SF: IV Mg 2g/hr for 24 hrs, f/u Mg serum, Full labs 0630, ctm, consulting nephrology and cardiology today  -  Pain: PO motrin q6hrs, tylenol q8hrs, oxycodone for severe pain PRN  -  Post-operatively labs: Hemodynamically stable, no symptoms of anemia   -  GI: tolerating regular diet, passing gas  -  : s/p meek , urinating without difficulty  -  DVT prophylaxis: encouraged increased ambulation, SCDs, Lovenox  -  Dispo: Today, unless mentioned otherwise.
A/P: 37y s/p  section c/b PEC w/ SF (BP, HA, [place], SOB), POD#7 HD4, stable  -  PEC w/ SF: S/P IV Mg 2g/hr for 24 hrs, labetalol 100 mg BID, Full labs this AM, nephrology and cardiology on board  -  Pain: PO motrin q6hrs, tylenol q8hrs, oxycodone for severe pain PRN  -  Post-operatively labs: Hemodynamically stable, no symptoms of anemia   -  GI: tolerating regular diet, passing gas  -  : s/p meek , urinating without difficulty  -  DVT prophylaxis: encouraged increased ambulation, SCDs, Lovenox  -  Dispo: when clinically and hemodynamically stable
37F with pmhx of c/s on 5/22 who presented to the hospital on May 26 in the setting of chest pain on inspiration and movement, HTN and swelling of her legs c/f PEC w/SF. Nephrology consulted for PEC management    Assessment/Plan:     #PEC w/SF  Overall pt feeling well. BP has remained below 140/90. Spoke with patient at length this AM, will have f/u with OBGYN later this week, will call and make telehealth visit with Nephrology as well so we can help manage her blood pressure  -C/w labetalol 100mg Q12h  -avoid NSAIDs, pain control w/Tylenol or other analgesics   -daily BMP   -strict I/Os   -renal diet   -On discharge should have tele health visit made with Nephrology [phone]. Advised to keep BP log and should have a blood pressure cuff ordered if patient does not already have one  -DC home today    Thank you for the opportunity to participate in the care of your patient. The nephrology service remains available to assist with any questions or concerns. Please feel free to reach us by paging the on-call nephrology fellow for urgent issues or as below.     Guille Brunson D.O.  PGY 4 - Nephrology Fellow  [phone]
37F  on POD6 s/p primary c/s on  for arrest of descent. Cardiology has been consulted for shortness of breath. CTPE negative for PE or pulmonary infiltrate, CT w/ trace pleural effusions. Cardiology has been consulted for shortness of breath.    Shortness of breath:   - TTE with normal biventricular size and function  - Mild-mod MR, no evidence of pulm HTN  - Symptoms improved after diuresis, but still persist  - No JVD, lungs clear to auscultation, on RA, minimal LE edema  - Would hold on additional IV diuresis  - Please stop continuous IV fluids  - If needs continuous fluids as part of Mg infusion, would give one additional IV lasix 20 mg x1

## 2022-05-29 NOTE — PROGRESS NOTE ADULT - SUBJECTIVE AND OBJECTIVE BOX
Patient evaluated at bedside.   She reports pain is well controlled with OPM.  She has been ambulating without assistance, voiding spontaneously, passing gas, tolerating regular diet and is breastfeeding.    She denies HA, dizziness, CP, palpitations, SOB, n/v, or heavy vaginal bleeding.    Physical Exam:  Vital Signs Last 24 Hrs  T(C): 36.7 (29 May 2022 04:00), Max: 36.8 (28 May 2022 08:00)  T(F): 98 (29 May 2022 04:00), Max: 98.3 (28 May 2022 08:00)  HR: 69 (29 May 2022 05:40) (69 - 88)  BP: 132/82 (29 May 2022 05:40) (132/82 - 145/89)  BP(mean): 108 (28 May 2022 10:00) (108 - 108)  RR: 18 (29 May 2022 05:40) (16 - 19)  SpO2: 97% (29 May 2022 05:40) (96% - 99%)    Gen: NAD  Abd: + BS, soft, nontender, nondistended, no rebound or guarding  Incision clean, dry and intact  uterus firm at midline  : lochia WNL  Extremities: no swelling or calf tenderness                          10.2   6.17  )-----------( 281      ( 29 May 2022 07:02 )             32.0     05-28    140  |  104  |  6<L>  ----------------------------<  85  3.4<L>   |  23  |  0.48<L>    Ca    8.2<L>      28 May 2022 08:35  Phos  3.3     05-27  Mg     5.3     05-28    TPro  6.5  /  Alb  3.6  /  TBili  0.2  /  DBili  x   /  AST  21  /  ALT  17  /  AlkPhos  120  05-28      PT/INR - ( 27 May 2022 23:10 )   PT: 11.5 sec;   INR: 0.97          PTT - ( 27 May 2022 23:10 )  PTT:28.5 sec

## 2022-05-29 NOTE — PROGRESS NOTE ADULT - SUBJECTIVE AND OBJECTIVE BOX
Patient evaluated at bedside.   She reports pain is well controlled with OPM.  She has been ambulating without assistance, voiding spontaneously, passing gas, tolerating regular diet and is breastfeeding.    She denies HA, dizziness, CP, palpitations, SOB, n/v, or heavy vaginal bleeding.    Physical Exam:  vss  Gen: NAD  Abd: + BS, soft, nontender, nondistended, no rebound or guarding  Incision clean, dry and intact  uterus firm at midline,   fb below umbilicus  : lochia WNL  Extremities: no swelling or calf tenderness    readmitted for obs, elevated bp, s/p mag  stable  f/u nephro and cardio rec

## 2022-05-29 NOTE — DISCHARGE NOTE OB - PROVIDER TOKENS
PROVIDER:[TOKEN:[[id number]],FOLLOWUP:[1 week],ESTABLISHEDPATIENT:[T]],PROVIDER:[TOKEN:[[id number]],FOLLOWUP:[2 weeks]],PROVIDER:[TOKEN:[[id number]],FOLLOWUP:[2 weeks]]

## 2022-05-29 NOTE — DISCHARGE NOTE OB - CARE PROVIDER_API CALL
Sandra Layton)  Obstetrics and Gynecology  [address] [place] [address]  Phone: [phone]  Fax: [phone]  Established Patient  Follow Up Time: 1 week    Brit Garcia; JENNIE)  Adult Congenital Heart Disease; Cardiovascular Disease; Internal Medicine  [address], [address] [address]  Phone: [phone]  Fax: [phone]  Follow Up Time: 2 weeks    Naveed Phipps)  Internal Medicine; Nephrology  [address] [address]  Phone: [phone]  Fax: [phone]  Follow Up Time: 2 weeks

## 2022-05-29 NOTE — DISCHARGE NOTE OB - CARE PLAN
Principal Discharge DX:	Severe preeclampsia  Assessment and plan of treatment:	Follow up with your OB in one week for a blood pressure check.  Purchase electronic blood pressure cuff at your local pharmacy. Check blood pressure 3x a day. If bp >160/110, you develop a headache not relieved by tylenol, visual disturbances, or right upper abdominal pain, call your doctor or the hospital, or go to your nearest emergency room. Regular diet, normal activity, nothing in the vagina for 6 weeks--no sex, tampons, tub baths, or swimming pools.   1

## 2022-05-29 NOTE — DISCHARGE NOTE OB - HOSPITAL COURSE
Pt was readmitted due to HA, chest pain and shortness of breath as part of PEC with severe features. She was treated with IV magnesium for 24 hours with good response. Her blood pressures are stable under treatment with Labetalol 100mg BID.   Prior to discharge the patient is feeling well, lab results are stable and vital signs are stable. Pt was readmitted due to HA, chest pain and shortness of breath as part of PEC with severe features. She was treated with IV magnesium for 24 hours with good response. Her blood pressures are stable under treatment with Labetalol 100mg BID. Cardiology was consulted and recommended a TTE which was significant for a dilated L atrium and mild-to-moderate MR. She will follow up with Dr. Garcia in 1-2 weeks. Nephrology was also consulted and recommended the labetalol 100mg BID. She will follow up with Nephrology in 1-2 weeks via telehealth.   Prior to discharge the patient is feeling well, lab results are stable and vital signs are stable.

## 2022-06-02 ENCOUNTER — APPOINTMENT (OUTPATIENT)
Dept: NEPHROLOGY | Facility: CLINIC | Age: 37
End: 2022-06-02
Payer: COMMERCIAL

## 2022-06-02 DIAGNOSIS — O24.419 GESTATIONAL DIABETES MELLITUS IN PREGNANCY, UNSPECIFIED CONTROL: ICD-10-CM

## 2022-06-02 DIAGNOSIS — O14.90 UNSPECIFIED PRE-ECLAMPSIA, UNSPECIFIED TRIMESTER: ICD-10-CM

## 2022-06-02 PROCEDURE — 99214 OFFICE O/P EST MOD 30 MIN: CPT | Mod: 95

## 2022-06-02 PROCEDURE — 99495 TRANSJ CARE MGMT MOD F2F 14D: CPT | Mod: 95

## 2022-06-02 NOTE — ASSESSMENT
[FreeTextEntry1] : # Preeclampsia with improvement after delivery.\par * Hold labetalol.\par * Restart labetalol 50 bid if SBP > 140 and contact me.\par * A counseling information sheet has been given (today). All their questions were answered.\par * The patient has been counseled to check their BP at home with an automatic arm cuff, write down the readings, and reach me directly on the phone immediately if they are persistently > 180 systolic or if SBP is less than 100 or if lightheadedness develops. They were counseled to bring in all blood pressure readings and medications next visit.\par * The patient has been counseled that regular office followup (at least every 4 months for now)  is important for monitoring and for their health, and that it is their responsibility to make follow up appointments.\par * The patient also has been counseled that they must never stop or change any medications without discussing this with me (or another physician). \par

## 2022-06-02 NOTE — HISTORY OF PRESENT ILLNESS
[Home] : at home, [unfilled] , at the time of the visit. [Medical Office: ([facility])___] : at the medical office located in  [Verbal consent obtained from patient] : the patient, [unfilled] [FreeTextEntry1] : Following up for preeclampsia. \par \par Started on labetalol for  - 160s during hospitalization, delivered May 27 by C section. Labetalol 100 bid started. However yesterdays, BP 100s and she held labetalol. \par \par

## 2022-06-03 DIAGNOSIS — Z88.0 ALLERGY STATUS TO PENICILLIN: ICD-10-CM

## 2022-06-10 LAB — SURGICAL PATHOLOGY STUDY: SIGNIFICANT CHANGE UP

## 2022-07-26 NOTE — ED ADULT TRIAGE NOTE - ARRIVAL FROM
Home Rituxan Pregnancy And Lactation Text: This medication is Pregnancy Category C and it isn't know if it is safe during pregnancy. It is unknown if this medication is excreted in breast milk but similar antibodies are known to be excreted.

## 2022-10-27 ENCOUNTER — RESULT REVIEW (OUTPATIENT)
Age: 37
End: 2022-10-27

## 2023-01-09 NOTE — DISCHARGE NOTE OB - MOOD SWINGS / DEPRESSION OR CRYING SPELLS LASTING MORE THAN 3 DAYS
Attempted to reach patient for ACM follow up. No answer to phone. Message left with ACM contact information and request for call back. UTR letter sent via MY Chart. No further outreach planned.
Statement Selected

## 2025-03-25 NOTE — DISCHARGE NOTE OB - WILL THE PATIENT ACCEPT THE PFIZER COVID-19 VACCINE IF ELIGIBLE AND IT IS AVAILABLE?
Constitutional : Well appearing, non-toxic, no acute distress. awake, alert, oriented to person, place, time/situation.  Head : head normocephalic, atraumatic  EENMT : eyes clear bilaterally, PERRL, EOMI. airway patent. moist mucous membranes. neck supple.  Cardiac : Normal rate, regular rhythm. No murmur appreciated, no LE edema.  Resp : Breath sounds clear and equal bilaterally. Respirations even and unlabored.   Gastro : abdomen soft, nontender, nondistended. no rebound or guarding. no CVAT.  MSK :  range of motion is not limited, no muscle or joint tenderness  Back : No evidence of trauma. No spinal tenderness.   Vasc : Extremities warm and well perfused. 2+ radial and DP pulses. cap refill <2 seconds  Neuro : Alert and oriented, CNII-XII grossly intact, no motor or sensory deficits.  Skin : Skin normal color for race, warm, dry and intact. No evidence of rash.  Psych : Alert and oriented to person, place, time/situation. normal mood and affect. no apparent risk to self or others.
Not applicable

## 2025-04-21 NOTE — H&P ADULT - NSICDXPASTSURGICALHX_GEN_ALL_CORE_FT
PAST SURGICAL HISTORY:  No significant past surgical history      Insight into violence risk and need for management/treatment